# Patient Record
Sex: MALE | Race: OTHER | HISPANIC OR LATINO | URBAN - METROPOLITAN AREA
[De-identification: names, ages, dates, MRNs, and addresses within clinical notes are randomized per-mention and may not be internally consistent; named-entity substitution may affect disease eponyms.]

---

## 2021-04-07 ENCOUNTER — INPATIENT (INPATIENT)
Facility: HOSPITAL | Age: 56
LOS: 8 days | Discharge: HOME | End: 2021-04-16
Attending: HOSPITALIST | Admitting: HOSPITALIST
Payer: MEDICAID

## 2021-04-07 VITALS
DIASTOLIC BLOOD PRESSURE: 65 MMHG | OXYGEN SATURATION: 95 % | SYSTOLIC BLOOD PRESSURE: 103 MMHG | TEMPERATURE: 100 F | HEART RATE: 86 BPM | RESPIRATION RATE: 17 BRPM

## 2021-04-07 PROCEDURE — 99285 EMERGENCY DEPT VISIT HI MDM: CPT

## 2021-04-07 RX ORDER — SODIUM CHLORIDE 9 MG/ML
1000 INJECTION INTRAMUSCULAR; INTRAVENOUS; SUBCUTANEOUS ONCE
Refills: 0 | Status: COMPLETED | OUTPATIENT
Start: 2021-04-07 | End: 2021-04-07

## 2021-04-07 NOTE — ED ADULT TRIAGE NOTE - CHIEF COMPLAINT QUOTE
Patient c/o RUQ painx4 days. Patient reports pain worsens after eating. Patient denies any nausea/vomiting or diarrhea. Patient was prescribed amoxicillin and flagyl by urgent care. Denies any past medical history.

## 2021-04-08 ENCOUNTER — TRANSCRIPTION ENCOUNTER (OUTPATIENT)
Age: 56
End: 2021-04-08

## 2021-04-08 DIAGNOSIS — U07.1 COVID-19: ICD-10-CM

## 2021-04-08 DIAGNOSIS — E78.5 HYPERLIPIDEMIA, UNSPECIFIED: ICD-10-CM

## 2021-04-08 DIAGNOSIS — J18.9 PNEUMONIA, UNSPECIFIED ORGANISM: ICD-10-CM

## 2021-04-08 DIAGNOSIS — J96.01 ACUTE RESPIRATORY FAILURE WITH HYPOXIA: ICD-10-CM

## 2021-04-08 DIAGNOSIS — K75.81 NONALCOHOLIC STEATOHEPATITIS (NASH): ICD-10-CM

## 2021-04-08 DIAGNOSIS — E11.9 TYPE 2 DIABETES MELLITUS WITHOUT COMPLICATIONS: ICD-10-CM

## 2021-04-08 LAB
A1C WITH ESTIMATED AVERAGE GLUCOSE RESULT: 7.2 % — HIGH (ref 4–5.6)
ALBUMIN SERPL ELPH-MCNC: 3.8 G/DL — SIGNIFICANT CHANGE UP (ref 3.3–5)
ALBUMIN SERPL ELPH-MCNC: 4.1 G/DL — SIGNIFICANT CHANGE UP (ref 3.3–5)
ALP SERPL-CCNC: 61 U/L — SIGNIFICANT CHANGE UP (ref 40–120)
ALP SERPL-CCNC: 74 U/L — SIGNIFICANT CHANGE UP (ref 40–120)
ALT FLD-CCNC: 27 U/L — SIGNIFICANT CHANGE UP (ref 4–41)
ALT FLD-CCNC: 35 U/L — SIGNIFICANT CHANGE UP (ref 4–41)
ANION GAP SERPL CALC-SCNC: 14 MMOL/L — SIGNIFICANT CHANGE UP (ref 7–14)
APTT BLD: 33.1 SEC — SIGNIFICANT CHANGE UP (ref 27–36.3)
AST SERPL-CCNC: 26 U/L — SIGNIFICANT CHANGE UP (ref 4–40)
AST SERPL-CCNC: 35 U/L — SIGNIFICANT CHANGE UP (ref 4–40)
BASE EXCESS BLDV CALC-SCNC: 1.6 MMOL/L — SIGNIFICANT CHANGE UP (ref -3–2)
BASOPHILS # BLD AUTO: 0.01 K/UL — SIGNIFICANT CHANGE UP (ref 0–0.2)
BASOPHILS NFR BLD AUTO: 0.2 % — SIGNIFICANT CHANGE UP (ref 0–2)
BILIRUB DIRECT SERPL-MCNC: <0.2 MG/DL — SIGNIFICANT CHANGE UP (ref 0–0.2)
BILIRUB INDIRECT FLD-MCNC: >0.2 MG/DL — SIGNIFICANT CHANGE UP (ref 0–1)
BILIRUB SERPL-MCNC: 0.4 MG/DL — SIGNIFICANT CHANGE UP (ref 0.2–1.2)
BILIRUB SERPL-MCNC: 0.5 MG/DL — SIGNIFICANT CHANGE UP (ref 0.2–1.2)
BLOOD GAS VENOUS - CREATININE: 0.9 MG/DL — SIGNIFICANT CHANGE UP (ref 0.5–1.3)
BLOOD GAS VENOUS COMPREHENSIVE RESULT: SIGNIFICANT CHANGE UP
BUN SERPL-MCNC: 14 MG/DL — SIGNIFICANT CHANGE UP (ref 7–23)
CALCIUM SERPL-MCNC: 8.9 MG/DL — SIGNIFICANT CHANGE UP (ref 8.4–10.5)
CHLORIDE BLDV-SCNC: 99 MMOL/L — SIGNIFICANT CHANGE UP (ref 96–108)
CHLORIDE SERPL-SCNC: 96 MMOL/L — LOW (ref 98–107)
CK SERPL-CCNC: 122 U/L — SIGNIFICANT CHANGE UP (ref 30–200)
CO2 SERPL-SCNC: 22 MMOL/L — SIGNIFICANT CHANGE UP (ref 22–31)
CREAT SERPL-MCNC: 0.78 MG/DL — SIGNIFICANT CHANGE UP (ref 0.5–1.3)
CREAT SERPL-MCNC: 0.85 MG/DL — SIGNIFICANT CHANGE UP (ref 0.5–1.3)
CRP SERPL-MCNC: 84.2 MG/L — HIGH
D DIMER BLD IA.RAPID-MCNC: <150 NG/ML DDU — SIGNIFICANT CHANGE UP
EOSINOPHIL # BLD AUTO: 0 K/UL — SIGNIFICANT CHANGE UP (ref 0–0.5)
EOSINOPHIL NFR BLD AUTO: 0 % — SIGNIFICANT CHANGE UP (ref 0–6)
ESTIMATED AVERAGE GLUCOSE: 160 MG/DL — HIGH (ref 68–114)
FERRITIN SERPL-MCNC: 346 NG/ML — SIGNIFICANT CHANGE UP (ref 30–400)
GAS PNL BLDV: 135 MMOL/L — LOW (ref 136–146)
GLUCOSE BLDC GLUCOMTR-MCNC: 187 MG/DL — HIGH (ref 70–99)
GLUCOSE BLDC GLUCOMTR-MCNC: 237 MG/DL — HIGH (ref 70–99)
GLUCOSE BLDC GLUCOMTR-MCNC: 240 MG/DL — HIGH (ref 70–99)
GLUCOSE BLDC GLUCOMTR-MCNC: 339 MG/DL — HIGH (ref 70–99)
GLUCOSE BLDV-MCNC: 157 MG/DL — HIGH (ref 70–99)
GLUCOSE SERPL-MCNC: 154 MG/DL — HIGH (ref 70–99)
HCO3 BLDV-SCNC: 23 MMOL/L — SIGNIFICANT CHANGE UP (ref 20–27)
HCT VFR BLD CALC: 43 % — SIGNIFICANT CHANGE UP (ref 39–50)
HCT VFR BLDA CALC: 45 % — SIGNIFICANT CHANGE UP (ref 39–51)
HGB BLD CALC-MCNC: 14.7 G/DL — SIGNIFICANT CHANGE UP (ref 13–17)
HGB BLD-MCNC: 13.8 G/DL — SIGNIFICANT CHANGE UP (ref 13–17)
IANC: 3.73 K/UL — SIGNIFICANT CHANGE UP (ref 1.5–8.5)
IMM GRANULOCYTES NFR BLD AUTO: 0.4 % — SIGNIFICANT CHANGE UP (ref 0–1.5)
INR BLD: 1.15 RATIO — SIGNIFICANT CHANGE UP (ref 0.88–1.16)
LACTATE BLDV-MCNC: 1.4 MMOL/L — SIGNIFICANT CHANGE UP (ref 0.5–2)
LACTATE SERPL-SCNC: 1.2 MMOL/L — SIGNIFICANT CHANGE UP (ref 0.5–2)
LDH SERPL L TO P-CCNC: 298 U/L — HIGH (ref 135–225)
LIDOCAIN IGE QN: 89 U/L — HIGH (ref 7–60)
LYMPHOCYTES # BLD AUTO: 1.36 K/UL — SIGNIFICANT CHANGE UP (ref 1–3.3)
LYMPHOCYTES # BLD AUTO: 24.3 % — SIGNIFICANT CHANGE UP (ref 13–44)
MCHC RBC-ENTMCNC: 27.2 PG — SIGNIFICANT CHANGE UP (ref 27–34)
MCHC RBC-ENTMCNC: 32.1 GM/DL — SIGNIFICANT CHANGE UP (ref 32–36)
MCV RBC AUTO: 84.6 FL — SIGNIFICANT CHANGE UP (ref 80–100)
MONOCYTES # BLD AUTO: 0.47 K/UL — SIGNIFICANT CHANGE UP (ref 0–0.9)
MONOCYTES NFR BLD AUTO: 8.4 % — SIGNIFICANT CHANGE UP (ref 2–14)
NEUTROPHILS # BLD AUTO: 3.73 K/UL — SIGNIFICANT CHANGE UP (ref 1.8–7.4)
NEUTROPHILS NFR BLD AUTO: 66.7 % — SIGNIFICANT CHANGE UP (ref 43–77)
NRBC # BLD: 0 /100 WBCS — SIGNIFICANT CHANGE UP
NRBC # FLD: 0 K/UL — SIGNIFICANT CHANGE UP
PCO2 BLDV: 46 MMHG — SIGNIFICANT CHANGE UP (ref 42–55)
PH BLDV: 7.38 — SIGNIFICANT CHANGE UP (ref 7.32–7.43)
PLATELET # BLD AUTO: 161 K/UL — SIGNIFICANT CHANGE UP (ref 150–400)
PO2 BLDV: <24 MMHG — LOW (ref 35–40)
POTASSIUM BLDV-SCNC: 4.3 MMOL/L — SIGNIFICANT CHANGE UP (ref 3.4–4.5)
POTASSIUM SERPL-MCNC: 4.2 MMOL/L — SIGNIFICANT CHANGE UP (ref 3.5–5.3)
POTASSIUM SERPL-SCNC: 4.2 MMOL/L — SIGNIFICANT CHANGE UP (ref 3.5–5.3)
PROCALCITONIN SERPL-MCNC: 0.07 NG/ML — SIGNIFICANT CHANGE UP (ref 0.02–0.1)
PROT SERPL-MCNC: 6.8 G/DL — SIGNIFICANT CHANGE UP (ref 6–8.3)
PROT SERPL-MCNC: 8.3 G/DL — SIGNIFICANT CHANGE UP (ref 6–8.3)
PROTHROM AB SERPL-ACNC: 13.1 SEC — SIGNIFICANT CHANGE UP (ref 10.6–13.6)
RBC # BLD: 5.08 M/UL — SIGNIFICANT CHANGE UP (ref 4.2–5.8)
RBC # FLD: 12.4 % — SIGNIFICANT CHANGE UP (ref 10.3–14.5)
SAO2 % BLDV: 17.4 % — LOW (ref 60–85)
SARS-COV-2 RNA SPEC QL NAA+PROBE: DETECTED
SODIUM SERPL-SCNC: 132 MMOL/L — LOW (ref 135–145)
TROPONIN T, HIGH SENSITIVITY RESULT: <6 NG/L — SIGNIFICANT CHANGE UP
WBC # BLD: 5.59 K/UL — SIGNIFICANT CHANGE UP (ref 3.8–10.5)
WBC # FLD AUTO: 5.59 K/UL — SIGNIFICANT CHANGE UP (ref 3.8–10.5)

## 2021-04-08 PROCEDURE — 99223 1ST HOSP IP/OBS HIGH 75: CPT

## 2021-04-08 PROCEDURE — 12345: CPT | Mod: NC

## 2021-04-08 PROCEDURE — 71045 X-RAY EXAM CHEST 1 VIEW: CPT | Mod: 26

## 2021-04-08 PROCEDURE — 76705 ECHO EXAM OF ABDOMEN: CPT | Mod: 26

## 2021-04-08 RX ORDER — AZITHROMYCIN 500 MG/1
500 TABLET, FILM COATED ORAL ONCE
Refills: 0 | Status: COMPLETED | OUTPATIENT
Start: 2021-04-08 | End: 2021-04-08

## 2021-04-08 RX ORDER — ENOXAPARIN SODIUM 100 MG/ML
40 INJECTION SUBCUTANEOUS DAILY
Refills: 0 | Status: DISCONTINUED | OUTPATIENT
Start: 2021-04-08 | End: 2021-04-16

## 2021-04-08 RX ORDER — REMDESIVIR 5 MG/ML
100 INJECTION INTRAVENOUS EVERY 24 HOURS
Refills: 0 | Status: COMPLETED | OUTPATIENT
Start: 2021-04-09 | End: 2021-04-12

## 2021-04-08 RX ORDER — DEXTROSE 50 % IN WATER 50 %
12.5 SYRINGE (ML) INTRAVENOUS ONCE
Refills: 0 | Status: DISCONTINUED | OUTPATIENT
Start: 2021-04-08 | End: 2021-04-16

## 2021-04-08 RX ORDER — DEXTROSE 50 % IN WATER 50 %
15 SYRINGE (ML) INTRAVENOUS ONCE
Refills: 0 | Status: DISCONTINUED | OUTPATIENT
Start: 2021-04-08 | End: 2021-04-16

## 2021-04-08 RX ORDER — ACETAMINOPHEN 500 MG
975 TABLET ORAL ONCE
Refills: 0 | Status: COMPLETED | OUTPATIENT
Start: 2021-04-08 | End: 2021-04-08

## 2021-04-08 RX ORDER — GLUCAGON INJECTION, SOLUTION 0.5 MG/.1ML
1 INJECTION, SOLUTION SUBCUTANEOUS ONCE
Refills: 0 | Status: DISCONTINUED | OUTPATIENT
Start: 2021-04-08 | End: 2021-04-16

## 2021-04-08 RX ORDER — SODIUM CHLORIDE 9 MG/ML
1000 INJECTION, SOLUTION INTRAVENOUS
Refills: 0 | Status: DISCONTINUED | OUTPATIENT
Start: 2021-04-08 | End: 2021-04-16

## 2021-04-08 RX ORDER — DEXTROSE 50 % IN WATER 50 %
25 SYRINGE (ML) INTRAVENOUS ONCE
Refills: 0 | Status: DISCONTINUED | OUTPATIENT
Start: 2021-04-08 | End: 2021-04-16

## 2021-04-08 RX ORDER — REMDESIVIR 5 MG/ML
200 INJECTION INTRAVENOUS EVERY 24 HOURS
Refills: 0 | Status: COMPLETED | OUTPATIENT
Start: 2021-04-08 | End: 2021-04-08

## 2021-04-08 RX ORDER — REMDESIVIR 5 MG/ML
INJECTION INTRAVENOUS
Refills: 0 | Status: COMPLETED | OUTPATIENT
Start: 2021-04-12 | End: 2021-04-12

## 2021-04-08 RX ORDER — INSULIN LISPRO 100/ML
VIAL (ML) SUBCUTANEOUS AT BEDTIME
Refills: 0 | Status: DISCONTINUED | OUTPATIENT
Start: 2021-04-08 | End: 2021-04-16

## 2021-04-08 RX ORDER — DEXAMETHASONE 0.5 MG/5ML
6 ELIXIR ORAL DAILY
Refills: 0 | Status: DISCONTINUED | OUTPATIENT
Start: 2021-04-08 | End: 2021-04-16

## 2021-04-08 RX ORDER — CEFTRIAXONE 500 MG/1
1000 INJECTION, POWDER, FOR SOLUTION INTRAMUSCULAR; INTRAVENOUS ONCE
Refills: 0 | Status: COMPLETED | OUTPATIENT
Start: 2021-04-08 | End: 2021-04-08

## 2021-04-08 RX ORDER — MORPHINE SULFATE 50 MG/1
2 CAPSULE, EXTENDED RELEASE ORAL ONCE
Refills: 0 | Status: DISCONTINUED | OUTPATIENT
Start: 2021-04-08 | End: 2021-04-08

## 2021-04-08 RX ORDER — ACETAMINOPHEN 500 MG
650 TABLET ORAL EVERY 4 HOURS
Refills: 0 | Status: DISCONTINUED | OUTPATIENT
Start: 2021-04-08 | End: 2021-04-16

## 2021-04-08 RX ORDER — ALBUTEROL 90 UG/1
2 AEROSOL, METERED ORAL EVERY 4 HOURS
Refills: 0 | Status: DISCONTINUED | OUTPATIENT
Start: 2021-04-08 | End: 2021-04-16

## 2021-04-08 RX ORDER — SODIUM CHLORIDE 9 MG/ML
1000 INJECTION INTRAMUSCULAR; INTRAVENOUS; SUBCUTANEOUS ONCE
Refills: 0 | Status: COMPLETED | OUTPATIENT
Start: 2021-04-08 | End: 2021-04-08

## 2021-04-08 RX ORDER — ACETAMINOPHEN 500 MG
650 TABLET ORAL EVERY 4 HOURS
Refills: 0 | Status: DISCONTINUED | OUTPATIENT
Start: 2021-04-08 | End: 2021-04-08

## 2021-04-08 RX ORDER — INSULIN LISPRO 100/ML
VIAL (ML) SUBCUTANEOUS
Refills: 0 | Status: DISCONTINUED | OUTPATIENT
Start: 2021-04-08 | End: 2021-04-16

## 2021-04-08 RX ADMIN — Medication 2: at 10:24

## 2021-04-08 RX ADMIN — ENOXAPARIN SODIUM 40 MILLIGRAM(S): 100 INJECTION SUBCUTANEOUS at 13:07

## 2021-04-08 RX ADMIN — Medication 8: at 18:31

## 2021-04-08 RX ADMIN — ALBUTEROL 2 PUFF(S): 90 AEROSOL, METERED ORAL at 21:33

## 2021-04-08 RX ADMIN — Medication 650 MILLIGRAM(S): at 22:10

## 2021-04-08 RX ADMIN — REMDESIVIR 200 MILLIGRAM(S): 5 INJECTION INTRAVENOUS at 13:06

## 2021-04-08 RX ADMIN — Medication 4: at 13:12

## 2021-04-08 RX ADMIN — AZITHROMYCIN 255 MILLIGRAM(S): 500 TABLET, FILM COATED ORAL at 04:10

## 2021-04-08 RX ADMIN — MORPHINE SULFATE 2 MILLIGRAM(S): 50 CAPSULE, EXTENDED RELEASE ORAL at 00:19

## 2021-04-08 RX ADMIN — Medication 975 MILLIGRAM(S): at 00:19

## 2021-04-08 RX ADMIN — Medication 6 MILLIGRAM(S): at 06:25

## 2021-04-08 RX ADMIN — SODIUM CHLORIDE 1000 MILLILITER(S): 9 INJECTION INTRAMUSCULAR; INTRAVENOUS; SUBCUTANEOUS at 02:45

## 2021-04-08 RX ADMIN — CEFTRIAXONE 100 MILLIGRAM(S): 500 INJECTION, POWDER, FOR SOLUTION INTRAMUSCULAR; INTRAVENOUS at 02:33

## 2021-04-08 RX ADMIN — Medication 650 MILLIGRAM(S): at 22:00

## 2021-04-08 RX ADMIN — Medication 650 MILLIGRAM(S): at 07:00

## 2021-04-08 RX ADMIN — SODIUM CHLORIDE 1000 MILLILITER(S): 9 INJECTION INTRAMUSCULAR; INTRAVENOUS; SUBCUTANEOUS at 00:09

## 2021-04-08 NOTE — ED PROVIDER NOTE - PROGRESS NOTE DETAILS
pt pending ua and us to be read. Endorse to night team. xr concerning for left sided pna, will order IV abx for CAP. pending us read. and will need an ambulatory sat.  Darron POSADA Delgado amb sat 89%. pt tachypneic with exertion. Hypotension 96/58. Improved with IVF.  Will admit for further treatment.  abd pain has improved. abd soft non tender. Labs unremarkable.

## 2021-04-08 NOTE — H&P ADULT - HISTORY OF PRESENT ILLNESS
Intake-3: Lyle Mckay  55 M no PMH p/w RUQ pain X1 day. Pain is constant, dull, nonradiating, worsened by food and associated with nausea, but no V/D.   Reports cough as well.  Seen at Urgent Care, COVID neg, discharged w/ Abx    NKDA    Meds:    Social: Patient is a 54 y/o M PMH HLD, DM2 p/w RUQ pain X2 day. Reports pain is intermittent,  dull, non-radiating, worsened with food, associated w/ nausea, no vomiting. Reports pain resolved in the ED, however, has developed a non-productive cough while in the ED. No other symptoms, no sick contacts, no recent travel.  Seen at Urgent Care 4/7 ~16:00, COVID neg, discharged w/ Abx

## 2021-04-08 NOTE — ED PROVIDER NOTE - NS_ ATTENDINGSCRIBEDETAILS _ED_A_ED_FT
The scribe's documentation has been prepared under my direction and personally reviewed by me in it's entirely.  I confirm that the note above accurately reflects all work, treatment, procedures, and medical decision making performed by me.  (Dr. Grewal)

## 2021-04-08 NOTE — ED ADULT NURSE NOTE - OBJECTIVE STATEMENT
Pt received to intake. Pt C/O new onset of RUQ pain. Pt endorsing symptoms for 4 days. Pt states pain is made worse with eating. Abd soft and tender at this time. Pt denies all other symptoms. Febrile at this time. Placed on 2L NC for O2 sat. Denies sick contacts. Resting comfortably. Labs drawn as ordered. MD wise in progress.

## 2021-04-08 NOTE — DISCHARGE NOTE PROVIDER - NSDCFUADDAPPT_GEN_ALL_CORE_FT
Follow up with your primary care doctor within one week of discharge. If you do not have one, you can call the medicine clinic at [phone] to make an appointment.

## 2021-04-08 NOTE — H&P ADULT - ASSESSMENT
55 M PMH HLD, DM2 p/w RUQ pain X2 day. Reports pain is intermittent,  dull, non-radiating, worsened with food, associated w/ nausea, no vomiting. Reports pain resolved in the ED, however, has developed a non-productive cough while in the ED. No other symptoms, no sick contacts, no recent travel.

## 2021-04-08 NOTE — ED ADULT NURSE REASSESSMENT NOTE - NS ED NURSE REASSESS COMMENT FT1
Pt resting comfortably at this time. Pt offering no complaints and remains in no acute distress. Respirations even and unlabored. Vitals per flowsheet. [place]bell remains in reach.

## 2021-04-08 NOTE — H&P ADULT - NSICDXPASTMEDICALHX_GEN_ALL_CORE_FT
PAST MEDICAL HISTORY:  Diabetes mellitus type 2, noninsulin dependent     Hyperlipidemia     No pertinent past medical history

## 2021-04-08 NOTE — H&P ADULT - NSHPPHYSICALEXAM_GEN_ALL_CORE
T(C): 37.2 (04-08-21 @ 02:34), Max: 38.4 (04-08-21 @ 00:10)  HR: 60 (04-08-21 @ 02:34) (60 - 86)  BP: 103/52 (04-08-21 @ 03:30) (96/58 - 160/86)  RR: 19 (04-08-21 @ 02:34) (17 - 19)  SpO2: 91% (04-08-21 @ 03:30) (91% - 97%)    Constitutional: NAD, well-developed, well-nourished  Ears, Nose, Mouth, and Throat: normal external ears and nose, normal hearing, moist oral mucosa  Eyes: normal conjunctiva, EOMI, PERRL  Neck: supple, no JVD  Respiratory: Clear to auscultation bilaterally. No wheezes, rales or rhonchi. Normal respiratory effort  Cardiovascular: RRR, no M/R/G, no edema, 2+ Peripheral Pulses  Gastrointestinal: soft, nontender, nondistended, +BS, no hernia  Skin: warm, dry, no rash  Neurologic: sensation grossly intact, CN grossly intact, non-focal exam  Musculoskeletal: no clubbing, no cyanosis, no joint swelling  Psychiatric: AOX3, appropriate mood, affect

## 2021-04-08 NOTE — DISCHARGE NOTE PROVIDER - PROVIDER TOKENS
FREE:[LAST:[Your PCP],PHONE:[(   )    -],FAX:[(   )    -]] FREE:[LAST:[Your Primary Care Doctor],PHONE:[(   )    -],FAX:[(   )    -]]

## 2021-04-08 NOTE — ED PROVIDER NOTE - PHYSICAL EXAMINATION
Dr Grewal  Sitting up, mmm, nonicteric.  HR 81 regular  no retractions. talking in full sentences.   soft tender at the RUQ no rebound.   Ao3

## 2021-04-08 NOTE — DISCHARGE NOTE PROVIDER - NSDCMRMEDTOKEN_GEN_ALL_CORE_FT
repaglinide:    aspirin 81 mg oral tablet, chewable: 1 tab(s) orally once a day  atorvastatin 10 mg oral tablet: 1 tab(s) orally once a day (at bedtime)  repaglinide:   rivaroxaban 10 mg oral tablet: 1 tab(s) orally once a day    aspirin 81 mg oral tablet, chewable: 1 tab(s) orally once a day  atorvastatin 10 mg oral tablet: 1 tab(s) orally once a day (at bedtime)  dexamethasone 6 mg oral tablet: 1 tab(s) orally once a day   repaglinide:   rivaroxaban 10 mg oral tablet: 1 tab(s) orally once a day    aspirin 81 mg oral tablet, chewable: 1 tab(s) orally once a day  atorvastatin 10 mg oral tablet: 1 tab(s) orally once a day (at bedtime)  dexamethasone 6 mg oral tablet: 1 tab(s) orally once a day   repaglinide 1 mg oral tablet: 4 tab(s) orally 3 times a day (before meals) on 4/17 and 4/18 ---THEN--- 1 tablet orally 3 times a day (before meals) starting on 4/19  rivaroxaban 10 mg oral tablet: 1 tab(s) orally once a day

## 2021-04-08 NOTE — DISCHARGE NOTE PROVIDER - NSDCFUADDINST_GEN_ALL_CORE_FT
If you have any new or worsening symptoms, contact your physician or return to the hospital. If you have any new or worsening symptoms, contact your physician or return to the hospital.    Resume Prandin at INCREASED dose of 4 mg three times daily before meals (Hold if not eating meal) for 4/17, 4/18. Decrease dose back to home dosing Prandin 1 mg three times daily before meals starting 4/19.

## 2021-04-08 NOTE — H&P ADULT - NSHPLABSRESULTS_GEN_ALL_CORE
04-08    132<L>  |  96<L>  |  14  ----------------------------<  154<H>  4.2   |  22  |  0.85    Ca    8.9      08 Apr 2021 00:33    TPro  8.3  /  Alb  4.1  /  TBili  0.5  /  DBili  x   /  AST  35  /  ALT  35  /  AlkPhos  74  04-08                          13.8   5.59  )-----------( 161      ( 08 Apr 2021 00:33 )             43.0       LIVER FUNCTIONS - ( 08 Apr 2021 00:33 )  Alb: 4.1 g/dL / Pro: 8.3 g/dL / ALK PHOS: 74 U/L / ALT: 35 U/L / AST: 35 U/L / GGT: x           EKG personally reviewed, NSR, no acute findings

## 2021-04-08 NOTE — H&P ADULT - NSHPREVIEWOFSYSTEMS_GEN_ALL_CORE
Constitutional Symptoms: No weakness, fevers, chills, weight loss  Eyes: No visual changes, headache, eye pain, double vision  Ears, Nose, Mouth, Throat: No runny nose, sinus pain, ear pain, tinnitus, sore throat, dysphagia, odynophagia  Cardiovascular: No chest pain, palpitations, edema  Respiratory: +cough, no wheezing, hemoptysis, shortness of breath  Gastrointestinal: +abdominal pain, no dysphagia, anorexia, nausea/vomiting, diarrhea/constipation, hematemesis, BRBPR, melena  Genitourinary: No dysuria, frequency, hematuria  Musculoskeletal: No joint pain, joint swelling, decreased ROM  Skin: No pruritus, rashes, lesions, wounds  Neurologic:  No seizures, headache, paraesthesia, numbness, limb weakness    Positives and pertinent negatives noted and all other systems negative.

## 2021-04-08 NOTE — CHART NOTE - NSCHARTNOTEFT_GEN_A_CORE
pt seen and examined by me  H&P reviewed from earlier today  feeling better  VSS  reports resolution of abd pain  on 2L NC  no resp distress  cont remdesivir  trend inflam markers

## 2021-04-08 NOTE — DISCHARGE NOTE PROVIDER - NSDCCPCAREPLAN_GEN_ALL_CORE_FT
PRINCIPAL DISCHARGE DIAGNOSIS  Diagnosis: COVID-19  Assessment and Plan of Treatment: You have been diagnosed with the COVID-19 virus during your hospital stay. You must self quarantine to complete a 14 day time period.  Monitor for fevers, shortness of breath and cough primarily.  Monitor your temperature daily to not any changes and increases.    It has been determined that you no longer need hospitalization and can recover while remaining in self-quarantine at home. You should follow the prevention steps below until a healthcare provider or local or state health department says you can return to your normal activities.  1. You should restrict activities outside your home, except for getting medical care.  2. Do not go to work, school, or public areas.  3. Avoid using public transportation, ride-sharing, or taxis.  4. Separate yourself from other people and animals in your home.  5. Call ahead before visiting your doctor.  6. Wear a facemask.  7. Cover your coughs and sneezes.  8. Clean your hands often.  9. Avoid sharing personal household items.  10. Clean all “high-touch” surfaces everyday.  11. Monitor your symptoms.  If you have a medical emergency and need to call 911, notify the dispatch personnel that you have COVID-19 If possible, put on a facemask before emergency medical services arrive.  12. Stopping home isolation.  Patients with confirmed COVID-19 should remain under home isolation precautions for 14 days since the positive COVID-19 test and until the risk of secondary transmission to others is thought to be low. The decision to discontinue home isolation precautions should be made on a case-by-case basis, in consultation with healthcare providers and state and local health departments. Your [facility] Department of Health can be reached at [phone] for further information about COVID-19.       PRINCIPAL DISCHARGE DIAGNOSIS  Diagnosis: COVID-19  Assessment and Plan of Treatment: You have been diagnosed with the COVID-19 virus during your hospital stay. You must self quarantine to complete a 10 day time period.  Monitor for fevers, shortness of breath and cough primarily.  Monitor your temperature daily to not any changes and increases.  While in the hospital you completed treatment with antiviral agent (Remdesivir) and steroids (Dexamethasone).  You did not meet criteria to discharge home with supplemental oxygen.  Your contact information was provided to the [facility] for COVID follow up after hospital discharge.     It has been determined that you no longer need hospitalization and can recover while remaining in self-quarantine at home. You should follow the prevention steps below until a healthcare provider or local or state health department says you can return to your normal activities.  1. You should restrict activities outside your home, except for getting medical care.  2. Do not go to work, school, or public areas.  3. Avoid using public transportation, ride-sharing, or taxis.  4. Separate yourself from other people and animals in your home.  5. Call ahead before visiting your doctor.  6. Wear a facemask.  7. Cover your coughs and sneezes.  8. Clean your hands often.  9. Avoid sharing personal household items.  10. Clean all “high-touch” surfaces everyday.  11. Monitor your symptoms.  If you have a medical emergency and need to call 911, notify the dispatch personnel that you have COVID-19 If possible, put on a facemask before emergency medical services arrive.  12. Stopping home isolation.  Patients with confirmed COVID-19 should remain under home isolation precautions for 10 days since the positive COVID-19 test and until the risk of secondary transmission to others is thought to be low. The decision to discontinue home isolation precautions should be made on a case-by-case basis, in consultation with healthcare providers and state and local health departments. Your [facility] Department of Health can be reached at [phone]      SECONDARY DISCHARGE DIAGNOSES  Diagnosis: COLE (nonalcoholic steatohepatitis)  Assessment and Plan of Treatment: You were found to have a fatty liver (nonalcoholic steatohepatitis) while in the hospital by ultrasound of your abdomen.  Your liver function bloodwork was normal. Your cholesterol panel was reviewed (ldl 91, chol 145) and you were started on cholesterol medication.  Please continue your medications as prescribed and follow up with your primary care doctor within one week of discharge for further management.       Diagnosis: Diabetes mellitus type 2, noninsulin dependent  Assessment and Plan of Treatment: You have a history of diabetes, and on admission to the hospital your A1C was 7.1.  You were given steroids in the hospital which caused your blood sugars to be high.  Please continue your medication regimen  as prescirbed.  Please follow a consistent carbohydrate diet (meaning eating the same amount of carbohydrates at the same time each day). Monitor blood glucose levels throughout the day before meals and at bedtime. Record blood sugars and bring to outpatient providers appointment in order to be reviewed by your doctor for management modifications. If your sugars are more than 400 or less than 70 you should contact your PCP immediately. Monitor for signs/symptoms of low blood glucose, such as, dizziness, altered mental status, or cool/clammy skin. In addition, monitor for signs/symptoms of high blood glucose, such as, feeling hot, dry, fatigued, or with increased thirst/urination. Make regular podiatry appointments in order to have feet checked for wounds and uncontrolled toe nail growth to prevent infections, as well as, appointments with an ophthalmologist to monitor your vision.     PRINCIPAL DISCHARGE DIAGNOSIS  Diagnosis: COVID-19  Assessment and Plan of Treatment: You have been diagnosed with the COVID-19 virus during your hospital stay. You must self quarantine to complete a 10 day time period.  Monitor for fevers, shortness of breath and cough primarily.  Monitor your temperature daily to not any changes and increases.  While in the hospital you completed treatment with antiviral agent (Remdesivir) and steroids (Dexamethasone).  You did not meet criteria to discharge home with supplemental oxygen.  Your contact information was provided to the [facility] for COVID follow up after hospital discharge.     It has been determined that you no longer need hospitalization and can recover while remaining in self-quarantine at home. You should follow the prevention steps below until a healthcare provider or local or state health department says you can return to your normal activities.  1. You should restrict activities outside your home, except for getting medical care.  2. Do not go to work, school, or public areas.  3. Avoid using public transportation, ride-sharing, or taxis.  4. Separate yourself from other people and animals in your home.  5. Call ahead before visiting your doctor.  6. Wear a facemask.  7. Cover your coughs and sneezes.  8. Clean your hands often.  9. Avoid sharing personal household items.  10. Clean all “high-touch” surfaces everyday.  11. Monitor your symptoms.  If you have a medical emergency and need to call 911, notify the dispatch personnel that you have COVID-19 If possible, put on a facemask before emergency medical services arrive.  12. Stopping home isolation.  Patients with confirmed COVID-19 should remain under home isolation precautions for 10 days since the positive COVID-19 test and until the risk of secondary transmission to others is thought to be low. The decision to discontinue home isolation precautions should be made on a case-by-case basis, in consultation with healthcare providers and state and local health departments. Your [facility] Department of Health can be reached at [phone]      SECONDARY DISCHARGE DIAGNOSES  Diagnosis: COLE (nonalcoholic steatohepatitis)  Assessment and Plan of Treatment: You were found to have a fatty liver (nonalcoholic steatohepatitis) while in the hospital by ultrasound of your abdomen.  Your liver function bloodwork was normal. Your cholesterol panel was reviewed (ldl 91, chol 145) and you were started on cholesterol medication.  Please continue your medications as prescribed and follow up with your primary care doctor within one week of discharge for further management.       Diagnosis: Diabetes mellitus type 2, noninsulin dependent  Assessment and Plan of Treatment: You have a history of diabetes, and on admission to the hospital your A1C was 7.1.  You were given steroids in the hospital which caused your blood sugars to be high.  Please continue your medication regimen  as prescirbed.  On discharge Resume Prandin at INCREASED dose of 4 mg PO TID before meals (Hold if not eating meal) for 4/17, 4/18. Decrease dose back to home dosing Prandin 1 mg TID before meals starting 4/19.  Please follow a consistent carbohydrate diet (meaning eating the same amount of carbohydrates at the same time each day). Monitor blood glucose levels throughout the day before meals and at bedtime. Record blood sugars and bring to outpatient providers appointment in order to be reviewed by your doctor for management modifications. If your sugars are more than 400 or less than 70 you should contact your PCP immediately. Monitor for signs/symptoms of low blood glucose, such as, dizziness, altered mental status, or cool/clammy skin. In addition, monitor for signs/symptoms of high blood glucose, such as, feeling hot, dry, fatigued, or with increased thirst/urination. Make regular podiatry appointments in order to have feet checked for wounds and uncontrolled toe nail growth to prevent infections, as well as, appointments with an ophthalmologist to monitor your vision.

## 2021-04-08 NOTE — DISCHARGE NOTE PROVIDER - NSFOLLOWUPCLINICS_GEN_ALL_ED_FT
[facility] [place]  Internal Medicine  [address] [place] [address]  Phone: [phone]  Fax: [phone]

## 2021-04-08 NOTE — ED PROVIDER NOTE - OBJECTIVE STATEMENT
56 y/o male with no reported pertinent PMHx presents to the ED with c/o x 1 day of RUQ pain. Pt states pain is constant, dull, and does not radiate and is not pleuritic. Pt notes pain is worsen by food and associated with nausea, but no V/D. Pt also denies any chest pain or SOB. As per Pt cough just started. Pt reports was seen at Urgent Care and states COVID test was negative and d/c with PO Abx. Of note no recent travel and no sick contacts. Dr Grewal  54 y/o male with no reported pertinent PMHx presents to the ED with c/o x 1 day of RUQ pain. Pt states pain is constant, dull, and does not radiate and is not pleuritic. Pt notes pain is worsen by food and associated with nausea, but no V/D. Pt also denies any chest pain or SOB. As per Pt cough just started. Pt reports was seen at Urgent Care and states COVID test was negative and d/c with PO Abx. Of note no recent travel and no sick contacts.

## 2021-04-08 NOTE — DISCHARGE NOTE PROVIDER - CARE PROVIDER_API CALL
Your PCP,   Phone: (   )    -  Fax: (   )    -  Follow Up Time:    Your Primary Care Doctor,   Phone: (   )    -  Fax: (   )    -  Follow Up Time:

## 2021-04-08 NOTE — ED PROVIDER NOTE - CLINICAL SUMMARY MEDICAL DECISION MAKING FREE TEXT BOX
Plan for labs, RUQ sonogram, CXR, IV fluids, pain control, and reassess. Plan for labs, RUQ sonogram, CXR, covid,  IV fluids, pain control, and reassess.

## 2021-04-08 NOTE — DISCHARGE NOTE PROVIDER - HOSPITAL COURSE
Patient is a 56 y/o M PMH HLD, DM2 p/w RUQ pain X2 day. Reports pain is intermittent,  dull, non-radiating, worsened with food, associated w/ nausea, no vomiting. Reports pain resolved in the ED, however, has developed a non-productive cough while in the ED. No other symptoms, no sick contacts, no recent travel. Seen at Urgent Care 4/7 ~16:00, COVID neg, discharged w/ Abx.    Patient was admitted for COVID. Patient was started on remdesivir and dexamethasone. Patient was started on supplemental oxygen. Patient was weaned to _______.     On ___ this case was reviewed with  ____, the patient is medically stable and optimized for discharge. All medications were reviewed and prescriptions were sent to mutually agreed upon pharmacy.              56 y/o M PMH HLD, DM2 p/w RUQ pain X2 day. Reports pain is intermittent,  dull, non-radiating, worsened with food, associated w/ nausea, no vomiting. Reports pain resolved in the ED, however, has developed a non-productive cough while in the ED. No other symptoms, no sick contacts, no recent travel. Seen at Urgent Care 4/7 ~16:00, COVID neg, discharged w/ Abx.    Acute respiratory failure with hypoxia 2/2 COVID-19  - required supplemental oxygen, tapered down to RA and satting 92% with ambulation  - completed 5 doses of remdesivir 4/12, c/w dexamethasone 6mg qd x10 doses through 4/18  - BP a bit soft, systolic 90's, asymptomatic  - refer to [employer] program, email [email or web address] and xarelto 10 mg qd x30 days on DC.     COLE (nonalcoholic steatohepatitis)  - outpt f/u  - nl LFT  - fatty liver on sono  - Lipid panel reviewed (ldl 91, chol 145), started low dose statin.     Diabetes mellitus type 2, noninsulin dependent  - Uncontrolled and volatile, A1c 7.1  - insulin regimen per endo, currently on lantus to 28u hs and admelog 28u tidac, on discharge transition on home regimen of prandin 1 mg before meals  - hyperglycemia likely d/t steroids  - c/w lipitor 10mg hs and ASA 81 mg for cardioprotection    Patient seen and evaluated. Reviewed discharge medications with patient and attending. All new medications requiring new prescriptions were sent to the pharmacy of patient's choice. Reviewed need for prescription for previous home medications and new prescriptions sent if requested. Medically cleared/stable for discharge as per Dr. Graves on 4/16/2021 with appropriate follow up. Patient understands and agrees with plan of care.               54 y/o M PMH HLD, DM2 p/w RUQ pain X2 day. Reports pain is intermittent,  dull, non-radiating, worsened with food, associated w/ nausea, no vomiting. Reports pain resolved in the ED, however, has developed a non-productive cough while in the ED. No other symptoms, no sick contacts, no recent travel. Seen at Urgent Care 4/7 ~16:00, COVID neg, discharged w/ Abx.    Acute respiratory failure with hypoxia 2/2 COVID-19  - required supplemental oxygen, tapered down to RA and satting 92% with ambulation  - completed 5 doses of remdesivir 4/12, c/w dexamethasone 6mg qd x10 doses through 4/18  - BP a bit soft, systolic 90's, asymptomatic  - refer to [employer] program, email [email or web address] and xarelto 10 mg qd x30 days on DC.     COLE (nonalcoholic steatohepatitis)  - outpt f/u  - nl LFT  - fatty liver on sono  - Lipid panel reviewed (ldl 91, chol 145), started low dose statin.     Diabetes mellitus type 2, noninsulin dependent  - Uncontrolled and volatile, A1c 7.1  - insulin regimen per endo, currently on lantus to 28u hs and admelog 28u tidac  - hyperglycemia likely d/t steroids  - c/w lipitor 10mg hs and ASA 81 mg for cardioprotection  - per endo: on dc Resume Prandin at INCREASED dose of 4 mg PO TID before meals (Hold if not eating meal) for 4/17. Decrease dose back to home dosing Prandin 1 mg TID before meals starting 4/18.    Patient seen and evaluated. Reviewed discharge medications with patient and attending. All new medications requiring new prescriptions were sent to the pharmacy of patient's choice. Reviewed need for prescription for previous home medications and new prescriptions sent if requested. Medically cleared/stable for discharge as per Dr. Graves on 4/16/2021 with appropriate follow up. Patient understands and agrees with plan of care.               54 y/o M PMH HLD, DM2 p/w RUQ pain X2 day. Reports pain is intermittent,  dull, non-radiating, worsened with food, associated w/ nausea, no vomiting. Reports pain resolved in the ED, however, has developed a non-productive cough while in the ED. No other symptoms, no sick contacts, no recent travel. Seen at Urgent Care 4/7 ~16:00, COVID neg, discharged w/ Abx.    Acute respiratory failure with hypoxia 2/2 COVID-19  - required supplemental oxygen, tapered down to RA and satting 92% with ambulation  - completed 5 doses of remdesivir 4/12, c/w dexamethasone 6mg qd x10 doses through 4/18  - BP a bit soft, systolic 90's, asymptomatic  - refer to [employer] program, email [email or web address] and xarelto 10 mg qd x30 days on DC.     COLE (nonalcoholic steatohepatitis)  - outpt f/u  - nl LFT  - fatty liver on sono  - Lipid panel reviewed (ldl 91, chol 145), started low dose statin.     Diabetes mellitus type 2, noninsulin dependent  - Uncontrolled and volatile, A1c 7.1  - insulin regimen per endo, currently on lantus to 28u hs and admelog 28u tidac  - hyperglycemia likely d/t steroids  - c/w lipitor 10mg hs and ASA 81 mg for cardioprotection  - per endo: on dc Resume Prandin at INCREASED dose of 4 mg PO TID before meals (Hold if not eating meal) for 4/17, 4/18. Decrease dose back to home dosing Prandin 1 mg TID before meals starting 4/19.    Patient seen and evaluated. Reviewed discharge medications with patient and attending. All new medications requiring new prescriptions were sent to the pharmacy of patient's choice. Reviewed need for prescription for previous home medications and new prescriptions sent if requested. Medically cleared/stable for discharge as per Dr. Graves on 4/16/2021 with appropriate follow up. Patient understands and agrees with plan of care.

## 2021-04-09 LAB
A1C WITH ESTIMATED AVERAGE GLUCOSE RESULT: 7.1 % — HIGH (ref 4–5.6)
ALBUMIN SERPL ELPH-MCNC: 3.6 G/DL — SIGNIFICANT CHANGE UP (ref 3.3–5)
ALBUMIN SERPL ELPH-MCNC: 3.7 G/DL — SIGNIFICANT CHANGE UP (ref 3.3–5)
ALP SERPL-CCNC: 65 U/L — SIGNIFICANT CHANGE UP (ref 40–120)
ALP SERPL-CCNC: 70 U/L — SIGNIFICANT CHANGE UP (ref 40–120)
ALT FLD-CCNC: 24 U/L — SIGNIFICANT CHANGE UP (ref 4–41)
ALT FLD-CCNC: 25 U/L — SIGNIFICANT CHANGE UP (ref 4–41)
ANION GAP SERPL CALC-SCNC: 12 MMOL/L — SIGNIFICANT CHANGE UP (ref 7–14)
AST SERPL-CCNC: 22 U/L — SIGNIFICANT CHANGE UP (ref 4–40)
AST SERPL-CCNC: 23 U/L — SIGNIFICANT CHANGE UP (ref 4–40)
BILIRUB DIRECT SERPL-MCNC: <0.2 MG/DL — SIGNIFICANT CHANGE UP (ref 0–0.2)
BILIRUB INDIRECT FLD-MCNC: >0.2 MG/DL — SIGNIFICANT CHANGE UP (ref 0–1)
BILIRUB SERPL-MCNC: 0.4 MG/DL — SIGNIFICANT CHANGE UP (ref 0.2–1.2)
BILIRUB SERPL-MCNC: 0.4 MG/DL — SIGNIFICANT CHANGE UP (ref 0.2–1.2)
BUN SERPL-MCNC: 15 MG/DL — SIGNIFICANT CHANGE UP (ref 7–23)
CALCIUM SERPL-MCNC: 9.1 MG/DL — SIGNIFICANT CHANGE UP (ref 8.4–10.5)
CHLORIDE SERPL-SCNC: 106 MMOL/L — SIGNIFICANT CHANGE UP (ref 98–107)
CO2 SERPL-SCNC: 23 MMOL/L — SIGNIFICANT CHANGE UP (ref 22–31)
COVID-19 SPIKE DOMAIN AB INTERP: NEGATIVE — SIGNIFICANT CHANGE UP
COVID-19 SPIKE DOMAIN ANTIBODY RESULT: 0.4 U/ML — SIGNIFICANT CHANGE UP
CREAT SERPL-MCNC: 0.68 MG/DL — SIGNIFICANT CHANGE UP (ref 0.5–1.3)
CREAT SERPL-MCNC: 0.68 MG/DL — SIGNIFICANT CHANGE UP (ref 0.5–1.3)
ESTIMATED AVERAGE GLUCOSE: 157 MG/DL — HIGH (ref 68–114)
GLUCOSE BLDC GLUCOMTR-MCNC: 212 MG/DL — HIGH (ref 70–99)
GLUCOSE BLDC GLUCOMTR-MCNC: 304 MG/DL — HIGH (ref 70–99)
GLUCOSE BLDC GLUCOMTR-MCNC: 387 MG/DL — HIGH (ref 70–99)
GLUCOSE BLDC GLUCOMTR-MCNC: 393 MG/DL — HIGH (ref 70–99)
GLUCOSE BLDC GLUCOMTR-MCNC: 477 MG/DL — CRITICAL HIGH (ref 70–99)
GLUCOSE SERPL-MCNC: 164 MG/DL — HIGH (ref 70–99)
HCT VFR BLD CALC: 42.6 % — SIGNIFICANT CHANGE UP (ref 39–50)
HGB BLD-MCNC: 14.2 G/DL — SIGNIFICANT CHANGE UP (ref 13–17)
INR BLD: 1.18 RATIO — HIGH (ref 0.88–1.16)
MAGNESIUM SERPL-MCNC: 2.5 MG/DL — SIGNIFICANT CHANGE UP (ref 1.6–2.6)
MCHC RBC-ENTMCNC: 28.1 PG — SIGNIFICANT CHANGE UP (ref 27–34)
MCHC RBC-ENTMCNC: 33.3 GM/DL — SIGNIFICANT CHANGE UP (ref 32–36)
MCV RBC AUTO: 84.2 FL — SIGNIFICANT CHANGE UP (ref 80–100)
NRBC # BLD: 0 /100 WBCS — SIGNIFICANT CHANGE UP
NRBC # FLD: 0 K/UL — SIGNIFICANT CHANGE UP
PHOSPHATE SERPL-MCNC: 2.9 MG/DL — SIGNIFICANT CHANGE UP (ref 2.5–4.5)
PLATELET # BLD AUTO: 174 K/UL — SIGNIFICANT CHANGE UP (ref 150–400)
POTASSIUM SERPL-MCNC: 4.6 MMOL/L — SIGNIFICANT CHANGE UP (ref 3.5–5.3)
POTASSIUM SERPL-SCNC: 4.6 MMOL/L — SIGNIFICANT CHANGE UP (ref 3.5–5.3)
PROT SERPL-MCNC: 7.4 G/DL — SIGNIFICANT CHANGE UP (ref 6–8.3)
PROT SERPL-MCNC: 7.9 G/DL — SIGNIFICANT CHANGE UP (ref 6–8.3)
PROTHROM AB SERPL-ACNC: 13.4 SEC — SIGNIFICANT CHANGE UP (ref 10.6–13.6)
RBC # BLD: 5.06 M/UL — SIGNIFICANT CHANGE UP (ref 4.2–5.8)
RBC # FLD: 12.7 % — SIGNIFICANT CHANGE UP (ref 10.3–14.5)
SARS-COV-2 IGG+IGM SERPL QL IA: 0.4 U/ML — SIGNIFICANT CHANGE UP
SARS-COV-2 IGG+IGM SERPL QL IA: NEGATIVE — SIGNIFICANT CHANGE UP
SODIUM SERPL-SCNC: 141 MMOL/L — SIGNIFICANT CHANGE UP (ref 135–145)
WBC # BLD: 9.1 K/UL — SIGNIFICANT CHANGE UP (ref 3.8–10.5)
WBC # FLD AUTO: 9.1 K/UL — SIGNIFICANT CHANGE UP (ref 3.8–10.5)

## 2021-04-09 PROCEDURE — 99233 SBSQ HOSP IP/OBS HIGH 50: CPT

## 2021-04-09 RX ORDER — INSULIN GLARGINE 100 [IU]/ML
5 INJECTION, SOLUTION SUBCUTANEOUS AT BEDTIME
Refills: 0 | Status: DISCONTINUED | OUTPATIENT
Start: 2021-04-09 | End: 2021-04-10

## 2021-04-09 RX ORDER — INSULIN LISPRO 100/ML
2 VIAL (ML) SUBCUTANEOUS ONCE
Refills: 0 | Status: COMPLETED | OUTPATIENT
Start: 2021-04-09 | End: 2021-04-09

## 2021-04-09 RX ADMIN — Medication 10: at 17:41

## 2021-04-09 RX ADMIN — Medication 4: at 21:32

## 2021-04-09 RX ADMIN — Medication 8: at 13:54

## 2021-04-09 RX ADMIN — Medication 4: at 09:51

## 2021-04-09 RX ADMIN — REMDESIVIR 500 MILLIGRAM(S): 5 INJECTION INTRAVENOUS at 13:20

## 2021-04-09 RX ADMIN — ENOXAPARIN SODIUM 40 MILLIGRAM(S): 100 INJECTION SUBCUTANEOUS at 13:19

## 2021-04-09 RX ADMIN — Medication 2 UNIT(S): at 23:30

## 2021-04-09 RX ADMIN — Medication 650 MILLIGRAM(S): at 07:51

## 2021-04-09 RX ADMIN — Medication 650 MILLIGRAM(S): at 06:12

## 2021-04-09 RX ADMIN — INSULIN GLARGINE 5 UNIT(S): 100 INJECTION, SOLUTION SUBCUTANEOUS at 21:32

## 2021-04-09 RX ADMIN — Medication 6 MILLIGRAM(S): at 06:11

## 2021-04-09 NOTE — PROGRESS NOTE ADULT - SUBJECTIVE AND OBJECTIVE BOX
[facility] Division of Hospital Medicine  Aileen Fair MD  Pager [phone]    Patient is a 55y old  Male who presents with a chief complaint of RUQ pain       SUBJECTIVE / OVERNIGHT EVENTS: reports feeling better; +BM; abd pain resolved; fever overnight      MEDICATIONS  (STANDING):  dexAMETHasone  Injectable 6 milliGRAM(s) IV Push daily  dextrose 40% Gel 15 Gram(s) Oral once  dextrose 5%. 1000 milliLiter(s) (50 mL/Hr) IV Continuous <Continuous>  dextrose 5%. 1000 milliLiter(s) (100 mL/Hr) IV Continuous <Continuous>  dextrose 50% Injectable 25 Gram(s) IV Push once  dextrose 50% Injectable 12.5 Gram(s) IV Push once  dextrose 50% Injectable 25 Gram(s) IV Push once  enoxaparin Injectable 40 milliGRAM(s) SubCutaneous daily  glucagon  Injectable 1 milliGRAM(s) IntraMuscular once  insulin lispro (ADMELOG) corrective regimen sliding scale   SubCutaneous three times a day before meals  insulin lispro (ADMELOG) corrective regimen sliding scale   SubCutaneous at bedtime  remdesivir  IVPB   IV Intermittent   remdesivir  IVPB 100 milliGRAM(s) IV Intermittent every 24 hours    MEDICATIONS  (PRN):  acetaminophen   Tablet .. 650 milliGRAM(s) Oral every 4 hours PRN Temp greater or equal to 38C (100.4F), Mild Pain (1 - 3), Moderate Pain (4 - 6)  ALBUTerol    90 MICROgram(s) HFA Inhaler 2 Puff(s) Inhalation every 4 hours PRN Shortness of Breath and/or Wheezing      CAPILLARY BLOOD GLUCOSE  POCT Blood Glucose.: 304 mg/dL (09 Apr 2021 13:42)  POCT Blood Glucose.: 212 mg/dL (09 Apr 2021 09:48)  POCT Blood Glucose.: 240 mg/dL (08 Apr 2021 21:21)  POCT Blood Glucose.: 339 mg/dL (08 Apr 2021 18:26)          PHYSICAL EXAM:  Vital Signs Last 24 Hrs  T(F): 97.7 (09 Apr 2021 12:33), Max: 101.1 (08 Apr 2021 22:09)  HR: 64 (09 Apr 2021 12:33) (64 - 72)  BP: 108/71 (09 Apr 2021 12:33) (105/62 - 118/58)  RR: 18 (09 Apr 2021 12:33) (18 - 20)  SpO2: 94% (09 Apr 2021 12:33) (89% - 94%)    CONSTITUTIONAL: NAD  RESPIRATORY: Normal respiratory effort;   CARDIOVASCULAR: No lower extremity edema;   ABDOMEN: no rebound/guarding; mild RUQ tenderness  MUSCULOSKELETAL:  no joint swelling or tenderness to palpation  PSYCH: affect appropriate  NEUROLOGY: no gross sensory deficits       LABS:                        14.2   9.10  )-----------( 174      ( 09 Apr 2021 06:54 )             42.6     04-09    141  |  106  |  15  ----------------------------<  164<H>  4.6   |  23  |  0.68    Ca    9.1      09 Apr 2021 06:54  Phos  2.9     04-09  Mg     2.5     04-09    TPro  7.4  /  Alb  3.7  /  TBili  0.4  /  DBili  <0.2  /  AST  22  /  ALT  25  /  AlkPhos  65  04-09    PT/INR - ( 09 Apr 2021 06:54 )   PT: 13.4 sec;   INR: 1.18 ratio         PTT - ( 08 Apr 2021 09:16 )  PTT:33.1 sec  CARDIAC MARKERS ( 08 Apr 2021 09:16 )  x     / x     / 122 U/L / x     / x              Culture - Blood (collected 08 Apr 2021 02:27)  Source: .Blood Blood-Venous  Preliminary Report (09 Apr 2021 03:01):    No growth to date.    Culture - Blood (collected 08 Apr 2021 02:27)  Source: .Blood Blood-Peripheral  Preliminary Report (09 Apr 2021 03:01):    No growth to date.

## 2021-04-09 NOTE — DIETITIAN INITIAL EVALUATION ADULT. - OTHER INFO
Pt is 55 YOM with PMH HLD, DM2 p/w RUQ pain X2 day. Reports pain is intermittent,  dull, non-radiating, worsened with food, associated w/ nausea, no vomiting. Reports pain resolved in the ED, however, has developed a non-productive cough while in the ED. No other symptoms, no sick contacts, no recent travel.      Received nutrition consult for weight loss Prior to admission. Pt states usual weight is 160#, however noted admit weight of 145#. When questioned further regarding weight status, pt reports admit weight was guessed/stated and not an actual measured weight. Some weight loss likely considering acute illness, however, unable to assess actual weight loss amount at present.   Pt reports PO intakes are good and completing >75% of meals in house.  Denies chewing, swallowing difficulties or any nausea, vomiting, diarrhea, constipation. Remains with PmHx of Diabetes and reports good control. Educated on CSTCHO diet and related principles.

## 2021-04-09 NOTE — DIETITIAN INITIAL EVALUATION ADULT. - PROBLEM SELECTOR PLAN 1
[id number] Patient Status:  Hospital Outpatient Surgery   Age/Gender 9 month old male MRN [id number]   [facility] SURGERY Attending Zoey Muro MD   Hosp Day # 0 PCP Carlos Gibson MD       Anesthesia Post-op
d/w patient, will start remdesivir, decadron, lovenox, will send COVID labs

## 2021-04-09 NOTE — CHART NOTE - NSCHARTNOTEFT_GEN_A_CORE
RN notified that patient is hyperglycemic -477 and patient is asymptomatic, no distress at this time. Advised to give sliding scale dose -admelog of 4 units and lantus -5 units and recheck the FS in 2 hours. The repeat FS- 387. Ordered another 2 units of admelog. Will recheck around 2 am and continue to monitor,

## 2021-04-09 NOTE — DIETITIAN INITIAL EVALUATION ADULT. - PERTINENT LABORATORY DATA
CAPILLARY BLOOD GLUCOSE  POCT Blood Glucose.: 212 mg/dL (09 Apr 2021 09:48)  POCT Blood Glucose.: 240 mg/dL (08 Apr 2021 21:21)  POCT Blood Glucose.: 339 mg/dL (08 Apr 2021 18:26)  POCT Blood Glucose.: 237 mg/dL (08 Apr 2021 13:01)    HIGH: HgA1C  7.1

## 2021-04-09 NOTE — DIETITIAN INITIAL EVALUATION ADULT. - PERTINENT MEDS FT
MEDICATIONS  (STANDING):  dexAMETHasone  Injectable 6 milliGRAM(s) IV Push daily  dextrose 40% Gel 15 Gram(s) Oral once  dextrose 5%. 1000 milliLiter(s) (50 mL/Hr) IV Continuous <Continuous>  dextrose 5%. 1000 milliLiter(s) (100 mL/Hr) IV Continuous <Continuous>  dextrose 50% Injectable 25 Gram(s) IV Push once  dextrose 50% Injectable 12.5 Gram(s) IV Push once  dextrose 50% Injectable 25 Gram(s) IV Push once  enoxaparin Injectable 40 milliGRAM(s) SubCutaneous daily  glucagon  Injectable 1 milliGRAM(s) IntraMuscular once  insulin lispro (ADMELOG) corrective regimen sliding scale   SubCutaneous three times a day before meals  insulin lispro (ADMELOG) corrective regimen sliding scale   SubCutaneous at bedtime  remdesivir  IVPB   IV Intermittent   remdesivir  IVPB 100 milliGRAM(s) IV Intermittent every 24 hours

## 2021-04-10 LAB
ALBUMIN SERPL ELPH-MCNC: 3.4 G/DL — SIGNIFICANT CHANGE UP (ref 3.3–5)
ALP SERPL-CCNC: 70 U/L — SIGNIFICANT CHANGE UP (ref 40–120)
ALT FLD-CCNC: 22 U/L — SIGNIFICANT CHANGE UP (ref 4–41)
ANION GAP SERPL CALC-SCNC: 10 MMOL/L — SIGNIFICANT CHANGE UP (ref 7–14)
AST SERPL-CCNC: 20 U/L — SIGNIFICANT CHANGE UP (ref 4–40)
BILIRUB SERPL-MCNC: 0.3 MG/DL — SIGNIFICANT CHANGE UP (ref 0.2–1.2)
BUN SERPL-MCNC: 21 MG/DL — SIGNIFICANT CHANGE UP (ref 7–23)
CALCIUM SERPL-MCNC: 9.3 MG/DL — SIGNIFICANT CHANGE UP (ref 8.4–10.5)
CHLORIDE SERPL-SCNC: 110 MMOL/L — HIGH (ref 98–107)
CO2 SERPL-SCNC: 23 MMOL/L — SIGNIFICANT CHANGE UP (ref 22–31)
CREAT SERPL-MCNC: 0.62 MG/DL — SIGNIFICANT CHANGE UP (ref 0.5–1.3)
GLUCOSE BLDC GLUCOMTR-MCNC: 247 MG/DL — HIGH (ref 70–99)
GLUCOSE BLDC GLUCOMTR-MCNC: 278 MG/DL — HIGH (ref 70–99)
GLUCOSE BLDC GLUCOMTR-MCNC: 295 MG/DL — HIGH (ref 70–99)
GLUCOSE BLDC GLUCOMTR-MCNC: 303 MG/DL — HIGH (ref 70–99)
GLUCOSE BLDC GLUCOMTR-MCNC: 325 MG/DL — HIGH (ref 70–99)
GLUCOSE BLDC GLUCOMTR-MCNC: 339 MG/DL — HIGH (ref 70–99)
GLUCOSE BLDC GLUCOMTR-MCNC: 399 MG/DL — HIGH (ref 70–99)
GLUCOSE SERPL-MCNC: 272 MG/DL — HIGH (ref 70–99)
HCT VFR BLD CALC: 42.3 % — SIGNIFICANT CHANGE UP (ref 39–50)
HGB BLD-MCNC: 14.3 G/DL — SIGNIFICANT CHANGE UP (ref 13–17)
MCHC RBC-ENTMCNC: 28.1 PG — SIGNIFICANT CHANGE UP (ref 27–34)
MCHC RBC-ENTMCNC: 33.8 GM/DL — SIGNIFICANT CHANGE UP (ref 32–36)
MCV RBC AUTO: 83.3 FL — SIGNIFICANT CHANGE UP (ref 80–100)
NRBC # BLD: 0 /100 WBCS — SIGNIFICANT CHANGE UP
NRBC # FLD: 0 K/UL — SIGNIFICANT CHANGE UP
PLATELET # BLD AUTO: 208 K/UL — SIGNIFICANT CHANGE UP (ref 150–400)
POTASSIUM SERPL-MCNC: 4.2 MMOL/L — SIGNIFICANT CHANGE UP (ref 3.5–5.3)
POTASSIUM SERPL-SCNC: 4.2 MMOL/L — SIGNIFICANT CHANGE UP (ref 3.5–5.3)
PROT SERPL-MCNC: 7.7 G/DL — SIGNIFICANT CHANGE UP (ref 6–8.3)
RBC # BLD: 5.08 M/UL — SIGNIFICANT CHANGE UP (ref 4.2–5.8)
RBC # FLD: 12.9 % — SIGNIFICANT CHANGE UP (ref 10.3–14.5)
SODIUM SERPL-SCNC: 143 MMOL/L — SIGNIFICANT CHANGE UP (ref 135–145)
WBC # BLD: 8.08 K/UL — SIGNIFICANT CHANGE UP (ref 3.8–10.5)
WBC # FLD AUTO: 8.08 K/UL — SIGNIFICANT CHANGE UP (ref 3.8–10.5)

## 2021-04-10 PROCEDURE — 93010 ELECTROCARDIOGRAM REPORT: CPT

## 2021-04-10 PROCEDURE — 99233 SBSQ HOSP IP/OBS HIGH 50: CPT

## 2021-04-10 RX ORDER — INSULIN GLARGINE 100 [IU]/ML
10 INJECTION, SOLUTION SUBCUTANEOUS AT BEDTIME
Refills: 0 | Status: DISCONTINUED | OUTPATIENT
Start: 2021-04-10 | End: 2021-04-11

## 2021-04-10 RX ORDER — INSULIN LISPRO 100/ML
2 VIAL (ML) SUBCUTANEOUS ONCE
Refills: 0 | Status: COMPLETED | OUTPATIENT
Start: 2021-04-10 | End: 2021-04-10

## 2021-04-10 RX ADMIN — REMDESIVIR 500 MILLIGRAM(S): 5 INJECTION INTRAVENOUS at 13:34

## 2021-04-10 RX ADMIN — Medication 2 UNIT(S): at 02:05

## 2021-04-10 RX ADMIN — ENOXAPARIN SODIUM 40 MILLIGRAM(S): 100 INJECTION SUBCUTANEOUS at 13:34

## 2021-04-10 RX ADMIN — INSULIN GLARGINE 10 UNIT(S): 100 INJECTION, SOLUTION SUBCUTANEOUS at 22:42

## 2021-04-10 RX ADMIN — Medication 3: at 21:44

## 2021-04-10 RX ADMIN — Medication 4: at 07:17

## 2021-04-10 RX ADMIN — Medication 6: at 13:33

## 2021-04-10 RX ADMIN — Medication 6 MILLIGRAM(S): at 06:27

## 2021-04-10 NOTE — PROGRESS NOTE ADULT - SUBJECTIVE AND OBJECTIVE BOX
[facility] Division of Hospital Medicine  Aileen Fair MD  Pager [phone]    Patient is a 55y old  Male who presents with a chief complaint of RUQ pain       SUBJECTIVE / OVERNIGHT EVENTS: pt seen this AM with NRB on; spoke with RN to titrate O2 at pt without distress and sat 100%; unclear why pt on NRB; pt states he was coughing a lot, perhaps there was a drop in sat      MEDICATIONS  (STANDING):  dexAMETHasone  Injectable 6 milliGRAM(s) IV Push daily  dextrose 40% Gel 15 Gram(s) Oral once  dextrose 5%. 1000 milliLiter(s) (50 mL/Hr) IV Continuous <Continuous>  dextrose 5%. 1000 milliLiter(s) (100 mL/Hr) IV Continuous <Continuous>  dextrose 50% Injectable 25 Gram(s) IV Push once  dextrose 50% Injectable 12.5 Gram(s) IV Push once  dextrose 50% Injectable 25 Gram(s) IV Push once  enoxaparin Injectable 40 milliGRAM(s) SubCutaneous daily  glucagon  Injectable 1 milliGRAM(s) IntraMuscular once  insulin glargine Injectable (LANTUS) 10 Unit(s) SubCutaneous at bedtime  insulin lispro (ADMELOG) corrective regimen sliding scale   SubCutaneous three times a day before meals  insulin lispro (ADMELOG) corrective regimen sliding scale   SubCutaneous at bedtime  remdesivir  IVPB   IV Intermittent   remdesivir  IVPB 100 milliGRAM(s) IV Intermittent every 24 hours    MEDICATIONS  (PRN):  acetaminophen   Tablet .. 650 milliGRAM(s) Oral every 4 hours PRN Temp greater or equal to 38C (100.4F), Mild Pain (1 - 3), Moderate Pain (4 - 6)  ALBUTerol    90 MICROgram(s) HFA Inhaler 2 Puff(s) Inhalation every 4 hours PRN Shortness of Breath and/or Wheezing      CAPILLARY BLOOD GLUCOSE  POCT Blood Glucose.: 278 mg/dL (10 Apr 2021 09:06)  POCT Blood Glucose.: 247 mg/dL (10 Apr 2021 07:10)  POCT Blood Glucose.: 303 mg/dL (10 Apr 2021 04:29)  POCT Blood Glucose.: 339 mg/dL (10 Apr 2021 01:53)  POCT Blood Glucose.: 387 mg/dL (09 Apr 2021 23:10)  POCT Blood Glucose.: 477 mg/dL (09 Apr 2021 21:17)  POCT Blood Glucose.: 393 mg/dL (09 Apr 2021 17:39)  POCT Blood Glucose.: 304 mg/dL (09 Apr 2021 13:42)        PHYSICAL EXAM:  Vital Signs Last 24 Hrs  T(F): 98.1 (10 Apr 2021 09:03), Max: 98.8 (09 Apr 2021 21:15)  HR: 50 (10 Apr 2021 09:03) (50 - 68)  BP: 112/65 (10 Apr 2021 09:03) (111/76 - 115/70)  RR: 15 (10 Apr 2021 09:03) (15 - 16)  SpO2: 95% (10 Apr 2021 09:03) (95% - 100%)    CONSTITUTIONAL: NAD  RESPIRATORY: Normal respiratory effort;  CARDIOVASCULAR: No lower extremity edema;   ABDOMEN: Nontender to palpation, soft  MUSCULOSKELETAL: no joint swelling or tenderness to palpation  PSYCH: affect appropriate  NEUROLOGY: no gross sensory deficits       LABS:                        14.3   8.08  )-----------( 208      ( 10 Apr 2021 07:09 )             42.3     04-10    143  |  110<H>  |  21  ----------------------------<  272<H>  4.2   |  23  |  0.62    Ca    9.3      10 Apr 2021 07:09  Phos  2.9     04-09  Mg     2.5     04-09    TPro  7.7  /  Alb  3.4  /  TBili  0.3  /  DBili  x   /  AST  20  /  ALT  22  /  AlkPhos  70  04-10    PT/INR - ( 09 Apr 2021 06:54 )   PT: 13.4 sec;   INR: 1.18 ratio                   Culture - Blood (collected 08 Apr 2021 02:27)  Source: .Blood Blood-Venous  Preliminary Report (09 Apr 2021 03:01):    No growth to date.    Culture - Blood (collected 08 Apr 2021 02:27)  Source: .Blood Blood-Peripheral  Preliminary Report (09 Apr 2021 03:01):    No growth to date.

## 2021-04-11 LAB
ALBUMIN SERPL ELPH-MCNC: 3.6 G/DL — SIGNIFICANT CHANGE UP (ref 3.3–5)
ALP SERPL-CCNC: 69 U/L — SIGNIFICANT CHANGE UP (ref 40–120)
ALT FLD-CCNC: 24 U/L — SIGNIFICANT CHANGE UP (ref 4–41)
ANION GAP SERPL CALC-SCNC: 13 MMOL/L — SIGNIFICANT CHANGE UP (ref 7–14)
AST SERPL-CCNC: 24 U/L — SIGNIFICANT CHANGE UP (ref 4–40)
BILIRUB SERPL-MCNC: 0.4 MG/DL — SIGNIFICANT CHANGE UP (ref 0.2–1.2)
BUN SERPL-MCNC: 25 MG/DL — HIGH (ref 7–23)
CALCIUM SERPL-MCNC: 9.5 MG/DL — SIGNIFICANT CHANGE UP (ref 8.4–10.5)
CHLORIDE SERPL-SCNC: 104 MMOL/L — SIGNIFICANT CHANGE UP (ref 98–107)
CO2 SERPL-SCNC: 23 MMOL/L — SIGNIFICANT CHANGE UP (ref 22–31)
CREAT SERPL-MCNC: 0.61 MG/DL — SIGNIFICANT CHANGE UP (ref 0.5–1.3)
CRP SERPL-MCNC: 49.6 MG/L — HIGH
D DIMER BLD IA.RAPID-MCNC: <150 NG/ML DDU — SIGNIFICANT CHANGE UP
FERRITIN SERPL-MCNC: 506 NG/ML — HIGH (ref 30–400)
GLUCOSE BLDC GLUCOMTR-MCNC: 319 MG/DL — HIGH (ref 70–99)
GLUCOSE BLDC GLUCOMTR-MCNC: 336 MG/DL — HIGH (ref 70–99)
GLUCOSE BLDC GLUCOMTR-MCNC: 347 MG/DL — HIGH (ref 70–99)
GLUCOSE BLDC GLUCOMTR-MCNC: 360 MG/DL — HIGH (ref 70–99)
GLUCOSE BLDC GLUCOMTR-MCNC: 386 MG/DL — HIGH (ref 70–99)
GLUCOSE BLDC GLUCOMTR-MCNC: 410 MG/DL — HIGH (ref 70–99)
GLUCOSE BLDC GLUCOMTR-MCNC: 482 MG/DL — CRITICAL HIGH (ref 70–99)
GLUCOSE SERPL-MCNC: 335 MG/DL — HIGH (ref 70–99)
HCT VFR BLD CALC: 44 % — SIGNIFICANT CHANGE UP (ref 39–50)
HGB BLD-MCNC: 14.2 G/DL — SIGNIFICANT CHANGE UP (ref 13–17)
LDH SERPL L TO P-CCNC: 300 U/L — HIGH (ref 135–225)
MCHC RBC-ENTMCNC: 27.4 PG — SIGNIFICANT CHANGE UP (ref 27–34)
MCHC RBC-ENTMCNC: 32.3 GM/DL — SIGNIFICANT CHANGE UP (ref 32–36)
MCV RBC AUTO: 84.9 FL — SIGNIFICANT CHANGE UP (ref 80–100)
NRBC # BLD: 0 /100 WBCS — SIGNIFICANT CHANGE UP
NRBC # FLD: 0 K/UL — SIGNIFICANT CHANGE UP
PLATELET # BLD AUTO: 240 K/UL — SIGNIFICANT CHANGE UP (ref 150–400)
POTASSIUM SERPL-MCNC: 4.2 MMOL/L — SIGNIFICANT CHANGE UP (ref 3.5–5.3)
POTASSIUM SERPL-SCNC: 4.2 MMOL/L — SIGNIFICANT CHANGE UP (ref 3.5–5.3)
PROCALCITONIN SERPL-MCNC: 0.06 NG/ML — SIGNIFICANT CHANGE UP (ref 0.02–0.1)
PROT SERPL-MCNC: 7.3 G/DL — SIGNIFICANT CHANGE UP (ref 6–8.3)
RBC # BLD: 5.18 M/UL — SIGNIFICANT CHANGE UP (ref 4.2–5.8)
RBC # FLD: 12.8 % — SIGNIFICANT CHANGE UP (ref 10.3–14.5)
SODIUM SERPL-SCNC: 140 MMOL/L — SIGNIFICANT CHANGE UP (ref 135–145)
WBC # BLD: 8.28 K/UL — SIGNIFICANT CHANGE UP (ref 3.8–10.5)
WBC # FLD AUTO: 8.28 K/UL — SIGNIFICANT CHANGE UP (ref 3.8–10.5)

## 2021-04-11 PROCEDURE — 99233 SBSQ HOSP IP/OBS HIGH 50: CPT

## 2021-04-11 RX ORDER — INSULIN GLARGINE 100 [IU]/ML
14 INJECTION, SOLUTION SUBCUTANEOUS AT BEDTIME
Refills: 0 | Status: DISCONTINUED | OUTPATIENT
Start: 2021-04-11 | End: 2021-04-12

## 2021-04-11 RX ORDER — INSULIN LISPRO 100/ML
5 VIAL (ML) SUBCUTANEOUS
Refills: 0 | Status: DISCONTINUED | OUTPATIENT
Start: 2021-04-11 | End: 2021-04-12

## 2021-04-11 RX ADMIN — ALBUTEROL 2 PUFF(S): 90 AEROSOL, METERED ORAL at 06:02

## 2021-04-11 RX ADMIN — Medication 6 MILLIGRAM(S): at 06:02

## 2021-04-11 RX ADMIN — Medication 2: at 22:00

## 2021-04-11 RX ADMIN — Medication 5 UNIT(S): at 14:53

## 2021-04-11 RX ADMIN — Medication 8: at 09:45

## 2021-04-11 RX ADMIN — ENOXAPARIN SODIUM 40 MILLIGRAM(S): 100 INJECTION SUBCUTANEOUS at 12:01

## 2021-04-11 RX ADMIN — Medication 8: at 18:18

## 2021-04-11 RX ADMIN — Medication 5 UNIT(S): at 18:18

## 2021-04-11 RX ADMIN — INSULIN GLARGINE 14 UNIT(S): 100 INJECTION, SOLUTION SUBCUTANEOUS at 23:00

## 2021-04-11 RX ADMIN — REMDESIVIR 500 MILLIGRAM(S): 5 INJECTION INTRAVENOUS at 12:02

## 2021-04-11 RX ADMIN — Medication 10: at 13:37

## 2021-04-11 NOTE — PROGRESS NOTE ADULT - SUBJECTIVE AND OBJECTIVE BOX
[facility] Division of Hospital Medicine  Aileen Fair MD  Pager [phone]    Patient is a 55y old  Male who presents with a chief complaint of RUQ pain      SUBJECTIVE / OVERNIGHT EVENTS: pain improved; breathing comfortably; d/w RN to titrate off NRB to 6L      MEDICATIONS  (STANDING):  dexAMETHasone  Injectable 6 milliGRAM(s) IV Push daily  dextrose 40% Gel 15 Gram(s) Oral once  dextrose 5%. 1000 milliLiter(s) (50 mL/Hr) IV Continuous <Continuous>  dextrose 5%. 1000 milliLiter(s) (100 mL/Hr) IV Continuous <Continuous>  dextrose 50% Injectable 25 Gram(s) IV Push once  dextrose 50% Injectable 12.5 Gram(s) IV Push once  dextrose 50% Injectable 25 Gram(s) IV Push once  enoxaparin Injectable 40 milliGRAM(s) SubCutaneous daily  glucagon  Injectable 1 milliGRAM(s) IntraMuscular once  insulin glargine Injectable (LANTUS) 14 Unit(s) SubCutaneous at bedtime  insulin lispro (ADMELOG) corrective regimen sliding scale   SubCutaneous three times a day before meals  insulin lispro (ADMELOG) corrective regimen sliding scale   SubCutaneous at bedtime  insulin lispro Injectable (ADMELOG) 5 Unit(s) SubCutaneous three times a day before meals  remdesivir  IVPB   IV Intermittent   remdesivir  IVPB 100 milliGRAM(s) IV Intermittent every 24 hours    MEDICATIONS  (PRN):  acetaminophen   Tablet .. 650 milliGRAM(s) Oral every 4 hours PRN Temp greater or equal to 38C (100.4F), Mild Pain (1 - 3), Moderate Pain (4 - 6)  ALBUTerol    90 MICROgram(s) HFA Inhaler 2 Puff(s) Inhalation every 4 hours PRN Shortness of Breath and/or Wheezing      CAPILLARY BLOOD GLUCOSE  POCT Blood Glucose.: 386 mg/dL (11 Apr 2021 13:30)  POCT Blood Glucose.: 347 mg/dL (11 Apr 2021 09:32)  POCT Blood Glucose.: 399 mg/dL (10 Apr 2021 21:26)  POCT Blood Glucose.: 325 mg/dL (10 Apr 2021 18:00)        PHYSICAL EXAM:  Vital Signs Last 24 Hrs  T(F): 98.4 (11 Apr 2021 11:59), Max: 99.1 (10 Apr 2021 21:44)  HR: 61 (11 Apr 2021 11:59) (58 - 66)  BP: 116/67 (11 Apr 2021 11:59) (110/72 - 121/66)  RR: 19 (11 Apr 2021 11:59) (16 - 19)  SpO2: 95% (11 Apr 2021 11:59) (91% - 95%)    CONSTITUTIONAL: NAD  RESPIRATORY: Normal respiratory effort;  CARDIOVASCULAR: No lower extremity edema;  ABDOMEN: Nontender to palpation  MUSCULOSKELETAL:  no clubbing or cyanosis of digits;  PSYCH: A+O to person, place, and time; affect appropriate  NEUROLOGY: no gross sensory deficits       LABS:                        14.2   8.28  )-----------( 240      ( 11 Apr 2021 08:25 )             44.0     04-11    140  |  104  |  25<H>  ----------------------------<  335<H>  4.2   |  23  |  0.61    Ca    9.5      11 Apr 2021 08:25    TPro  7.3  /  Alb  3.6  /  TBili  0.4  /  DBili  x   /  AST  24  /  ALT  24  /  AlkPhos  69  04-11

## 2021-04-11 NOTE — PROGRESS NOTE ADULT - PROBLEM SELECTOR PLAN 4
inc lantus for tonight as pt with elevated gluc due to steroids  meal coverage added  monitor closely  FS QID  pt in steroid study

## 2021-04-11 NOTE — PROGRESS NOTE ADULT - PROBLEM SELECTOR PLAN 1
supplemental O2 as needed, titrate as tolerated, currently on 6L  cont remdesivir, dexa  incentive spirometry  chest PT

## 2021-04-12 DIAGNOSIS — I10 ESSENTIAL (PRIMARY) HYPERTENSION: ICD-10-CM

## 2021-04-12 DIAGNOSIS — E11.65 TYPE 2 DIABETES MELLITUS WITH HYPERGLYCEMIA: ICD-10-CM

## 2021-04-12 DIAGNOSIS — R73.9 HYPERGLYCEMIA, UNSPECIFIED: ICD-10-CM

## 2021-04-12 LAB
ALBUMIN SERPL ELPH-MCNC: 3.6 G/DL — SIGNIFICANT CHANGE UP (ref 3.3–5)
ALP SERPL-CCNC: 72 U/L — SIGNIFICANT CHANGE UP (ref 40–120)
ALT FLD-CCNC: 27 U/L — SIGNIFICANT CHANGE UP (ref 4–41)
ANION GAP SERPL CALC-SCNC: 14 MMOL/L — SIGNIFICANT CHANGE UP (ref 7–14)
AST SERPL-CCNC: 26 U/L — SIGNIFICANT CHANGE UP (ref 4–40)
BASOPHILS # BLD AUTO: 0.01 K/UL — SIGNIFICANT CHANGE UP (ref 0–0.2)
BASOPHILS NFR BLD AUTO: 0.1 % — SIGNIFICANT CHANGE UP (ref 0–2)
BILIRUB SERPL-MCNC: 0.4 MG/DL — SIGNIFICANT CHANGE UP (ref 0.2–1.2)
BUN SERPL-MCNC: 29 MG/DL — HIGH (ref 7–23)
CALCIUM SERPL-MCNC: 9.3 MG/DL — SIGNIFICANT CHANGE UP (ref 8.4–10.5)
CHLORIDE SERPL-SCNC: 103 MMOL/L — SIGNIFICANT CHANGE UP (ref 98–107)
CO2 SERPL-SCNC: 22 MMOL/L — SIGNIFICANT CHANGE UP (ref 22–31)
CREAT SERPL-MCNC: 0.65 MG/DL — SIGNIFICANT CHANGE UP (ref 0.5–1.3)
EOSINOPHIL # BLD AUTO: 0 K/UL — SIGNIFICANT CHANGE UP (ref 0–0.5)
EOSINOPHIL NFR BLD AUTO: 0 % — SIGNIFICANT CHANGE UP (ref 0–6)
GLUCOSE BLDC GLUCOMTR-MCNC: 338 MG/DL — HIGH (ref 70–99)
GLUCOSE SERPL-MCNC: 334 MG/DL — HIGH (ref 70–99)
HCT VFR BLD CALC: 42.6 % — SIGNIFICANT CHANGE UP (ref 39–50)
HGB BLD-MCNC: 13.9 G/DL — SIGNIFICANT CHANGE UP (ref 13–17)
IANC: 7.14 K/UL — SIGNIFICANT CHANGE UP (ref 1.5–8.5)
IMM GRANULOCYTES NFR BLD AUTO: 0.5 % — SIGNIFICANT CHANGE UP (ref 0–1.5)
LYMPHOCYTES # BLD AUTO: 1.21 K/UL — SIGNIFICANT CHANGE UP (ref 1–3.3)
LYMPHOCYTES # BLD AUTO: 13.7 % — SIGNIFICANT CHANGE UP (ref 13–44)
MAGNESIUM SERPL-MCNC: 2.4 MG/DL — SIGNIFICANT CHANGE UP (ref 1.6–2.6)
MCHC RBC-ENTMCNC: 27.3 PG — SIGNIFICANT CHANGE UP (ref 27–34)
MCHC RBC-ENTMCNC: 32.6 GM/DL — SIGNIFICANT CHANGE UP (ref 32–36)
MCV RBC AUTO: 83.7 FL — SIGNIFICANT CHANGE UP (ref 80–100)
MONOCYTES # BLD AUTO: 0.45 K/UL — SIGNIFICANT CHANGE UP (ref 0–0.9)
MONOCYTES NFR BLD AUTO: 5.1 % — SIGNIFICANT CHANGE UP (ref 2–14)
NEUTROPHILS # BLD AUTO: 7.14 K/UL — SIGNIFICANT CHANGE UP (ref 1.8–7.4)
NEUTROPHILS NFR BLD AUTO: 80.6 % — HIGH (ref 43–77)
NRBC # BLD: 0 /100 WBCS — SIGNIFICANT CHANGE UP
NRBC # FLD: 0 K/UL — SIGNIFICANT CHANGE UP
PHOSPHATE SERPL-MCNC: 3.7 MG/DL — SIGNIFICANT CHANGE UP (ref 2.5–4.5)
PLATELET # BLD AUTO: 256 K/UL — SIGNIFICANT CHANGE UP (ref 150–400)
POTASSIUM SERPL-MCNC: 4.6 MMOL/L — SIGNIFICANT CHANGE UP (ref 3.5–5.3)
POTASSIUM SERPL-SCNC: 4.6 MMOL/L — SIGNIFICANT CHANGE UP (ref 3.5–5.3)
PROT SERPL-MCNC: 7.3 G/DL — SIGNIFICANT CHANGE UP (ref 6–8.3)
RBC # BLD: 5.09 M/UL — SIGNIFICANT CHANGE UP (ref 4.2–5.8)
RBC # FLD: 12.6 % — SIGNIFICANT CHANGE UP (ref 10.3–14.5)
SODIUM SERPL-SCNC: 139 MMOL/L — SIGNIFICANT CHANGE UP (ref 135–145)
WBC # BLD: 8.85 K/UL — SIGNIFICANT CHANGE UP (ref 3.8–10.5)
WBC # FLD AUTO: 8.85 K/UL — SIGNIFICANT CHANGE UP (ref 3.8–10.5)

## 2021-04-12 PROCEDURE — 99233 SBSQ HOSP IP/OBS HIGH 50: CPT

## 2021-04-12 PROCEDURE — 99223 1ST HOSP IP/OBS HIGH 75: CPT

## 2021-04-12 RX ORDER — INSULIN LISPRO 100/ML
14 VIAL (ML) SUBCUTANEOUS
Refills: 0 | Status: DISCONTINUED | OUTPATIENT
Start: 2021-04-12 | End: 2021-04-13

## 2021-04-12 RX ORDER — INSULIN LISPRO 100/ML
14 VIAL (ML) SUBCUTANEOUS
Refills: 0 | Status: DISCONTINUED | OUTPATIENT
Start: 2021-04-13 | End: 2021-04-13

## 2021-04-12 RX ORDER — INSULIN GLARGINE 100 [IU]/ML
24 INJECTION, SOLUTION SUBCUTANEOUS AT BEDTIME
Refills: 0 | Status: DISCONTINUED | OUTPATIENT
Start: 2021-04-12 | End: 2021-04-13

## 2021-04-12 RX ORDER — INSULIN LISPRO 100/ML
10 VIAL (ML) SUBCUTANEOUS
Refills: 0 | Status: DISCONTINUED | OUTPATIENT
Start: 2021-04-12 | End: 2021-04-12

## 2021-04-12 RX ORDER — INSULIN GLARGINE 100 [IU]/ML
20 INJECTION, SOLUTION SUBCUTANEOUS AT BEDTIME
Refills: 0 | Status: DISCONTINUED | OUTPATIENT
Start: 2021-04-12 | End: 2021-04-12

## 2021-04-12 RX ADMIN — INSULIN GLARGINE 24 UNIT(S): 100 INJECTION, SOLUTION SUBCUTANEOUS at 22:00

## 2021-04-12 RX ADMIN — Medication 8: at 18:13

## 2021-04-12 RX ADMIN — Medication 6 MILLIGRAM(S): at 06:27

## 2021-04-12 RX ADMIN — Medication 8: at 09:47

## 2021-04-12 RX ADMIN — Medication 14 UNIT(S): at 18:13

## 2021-04-12 RX ADMIN — ENOXAPARIN SODIUM 40 MILLIGRAM(S): 100 INJECTION SUBCUTANEOUS at 11:29

## 2021-04-12 RX ADMIN — REMDESIVIR 500 MILLIGRAM(S): 5 INJECTION INTRAVENOUS at 12:39

## 2021-04-12 RX ADMIN — Medication 12: at 13:29

## 2021-04-12 RX ADMIN — Medication 5 UNIT(S): at 09:48

## 2021-04-12 RX ADMIN — Medication 10 UNIT(S): at 13:30

## 2021-04-12 NOTE — PROGRESS NOTE ADULT - PROBLEM SELECTOR PLAN 4
Uncontrolled, increase lantus to 20u hs and admelog 10u actid  endocrine consult (emailed), f/u recs  hyperglycemia likely d/t steroids  monitor FS closely  pt in steroid study Uncontrolled, increase lantus to 20u hs and admelog 10u actid  endocrine consult (emailed), f/u recs  hyperglycemia likely d/t steroids  A1c 7.1  monitor FS closely  pt in steroid study

## 2021-04-12 NOTE — CONSULT NOTE ADULT - SUBJECTIVE AND OBJECTIVE BOX
HPI:  Patient is a 54 y/o M PMH HLD, DM2 p/w RUQ pain X2 day. Reports pain is intermittent,  dull, non-radiating, worsened with food, associated w/ nausea, no vomiting. Reports pain resolved in the ED, however, has developed a non-productive cough while in the ED. No other symptoms, no sick contacts, no recent travel.  Seen at Urgent Care 4/7 ~16:00, COVID neg, discharged w/ Abx (08 Apr 2021 05:12)      PAST MEDICAL & SURGICAL HISTORY:  No pertinent past medical history    Diabetes mellitus type 2, noninsulin dependent    Hyperlipidemia    No significant past surgical history        FAMILY HISTORY:  No pertinent family history in first degree relatives        Social History:    Outpatient Medications:    MEDICATIONS  (STANDING):  dexAMETHasone  Injectable 6 milliGRAM(s) IV Push daily  dextrose 40% Gel 15 Gram(s) Oral once  dextrose 5%. 1000 milliLiter(s) (50 mL/Hr) IV Continuous <Continuous>  dextrose 5%. 1000 milliLiter(s) (100 mL/Hr) IV Continuous <Continuous>  dextrose 50% Injectable 25 Gram(s) IV Push once  dextrose 50% Injectable 12.5 Gram(s) IV Push once  dextrose 50% Injectable 25 Gram(s) IV Push once  enoxaparin Injectable 40 milliGRAM(s) SubCutaneous daily  glucagon  Injectable 1 milliGRAM(s) IntraMuscular once  insulin glargine Injectable (LANTUS) 20 Unit(s) SubCutaneous at bedtime  insulin lispro (ADMELOG) corrective regimen sliding scale   SubCutaneous three times a day before meals  insulin lispro (ADMELOG) corrective regimen sliding scale   SubCutaneous at bedtime  insulin lispro Injectable (ADMELOG) 10 Unit(s) SubCutaneous three times a day before meals    MEDICATIONS  (PRN):  acetaminophen   Tablet .. 650 milliGRAM(s) Oral every 4 hours PRN Temp greater or equal to 38C (100.4F), Mild Pain (1 - 3), Moderate Pain (4 - 6)  ALBUTerol    90 MICROgram(s) HFA Inhaler 2 Puff(s) Inhalation every 4 hours PRN Shortness of Breath and/or Wheezing      Allergies    No Known Allergies    Intolerances      Review of Systems:  Constitutional: No fever  Eyes: No blurry vision  Neuro: No tremors  HEENT: No pain  Cardiovascular: No chest pain, palpitations  Respiratory: No SOB, no cough  GI: No nausea, vomiting, abdominal pain  : No dysuria  Skin: no rash  Psych: no depression  Endocrine: no polyuria, polydipsia  Hem/lymph: no swelling  Osteoporosis: no fractures    ALL OTHER SYSTEMS REVIEWED AND NEGATIVE    UNABLE TO OBTAIN    PHYSICAL EXAM:  VITALS: T(C): 36.6 (04-12-21 @ 09:45)  T(F): 97.9 (04-12-21 @ 09:45), Max: 98.4 (04-11-21 @ 23:00)  HR: 57 (04-12-21 @ 09:45) (50 - 59)  BP: 117/70 (04-12-21 @ 09:45) (111/71 - 117/70)  RR:  (18 - 18)  SpO2:  (90% - 95%)  Wt(kg): --  GENERAL: NAD, well-groomed, well-developed  EYES: No proptosis, no lid lag, anicteric  HEENT:  Atraumatic, Normocephalic, moist mucous membranes  THYROID: Normal size, no palpable nodules  RESPIRATORY: + air movement bilaterally, no respiratory distress,   CARDIOVASCULAR: Regular rate and rhythm; no peripheral edema  GI: Soft, nontender, non distended, normal bowel sounds  SKIN: Dry, intact, No ulcers on feet  MUSCULOSKELETAL: Full range of motion, normal strength  PSYCH: Alert and oriented x 3, reactive affect, euthymic mood       POCT Blood Glucose.: 372 mg/dL (04-12-21 @ 13:18)  POCT Blood Glucose.: 416 mg/dL (04-12-21 @ 13:18)  POCT Blood Glucose.: 338 mg/dL (04-12-21 @ 09:11)  POCT Blood Glucose.: 319 mg/dL (04-11-21 @ 21:18)  POCT Blood Glucose.: 336 mg/dL (04-11-21 @ 17:57)  POCT Blood Glucose.: 360 mg/dL (04-11-21 @ 16:04)  POCT Blood Glucose.: 410 mg/dL (04-11-21 @ 14:52)  POCT Blood Glucose.: 482 mg/dL (04-11-21 @ 14:50)  POCT Blood Glucose.: 386 mg/dL (04-11-21 @ 13:30)  POCT Blood Glucose.: 347 mg/dL (04-11-21 @ 09:32)  POCT Blood Glucose.: 399 mg/dL (04-10-21 @ 21:26)  POCT Blood Glucose.: 325 mg/dL (04-10-21 @ 18:00)  POCT Blood Glucose.: 295 mg/dL (04-10-21 @ 13:04)  POCT Blood Glucose.: 278 mg/dL (04-10-21 @ 09:06)  POCT Blood Glucose.: 247 mg/dL (04-10-21 @ 07:10)  POCT Blood Glucose.: 303 mg/dL (04-10-21 @ 04:29)  POCT Blood Glucose.: 339 mg/dL (04-10-21 @ 01:53)  POCT Blood Glucose.: 387 mg/dL (04-09-21 @ 23:10)  POCT Blood Glucose.: 477 mg/dL (04-09-21 @ 21:17)  POCT Blood Glucose.: 393 mg/dL (04-09-21 @ 17:39)                          13.9   8.85  )-----------( 256      ( 12 Apr 2021 07:44 )             42.6       04-12    139  |  103  |  29<H>  ----------------------------<  334<H>  4.6   |  22  |  0.65    EGFR if : 129  EGFR if non : 111    Ca    9.3      04-12  Mg     2.4     04-12  Phos  3.7     04-12    TPro  7.3  /  Alb  3.6  /  TBili  0.4  /  DBili  x   /  AST  26  /  ALT  27  /  AlkPhos  72  04-12      A1c 7.1%             HPI:  Patient is a 55 year old man with HTN, HLD, DM2, a1c 7.1%, presents with RUQ pain, found to have COVID, now on oxygen and dexmethasone. Consult called for management of DM2 and steroid induced hyperglycemia. Patient seen at bedside, states that he was diagnosed with DM2 about 5 years ago. Takes Prandin 1 mg before breakfast, and sometimes takes 1 mg before dinner as well, depending on his BG and what he eats. He checks BG 1-2 times a day, notes that his AM fasting BG is usually in the low 100's to 120's, rarely higher than the 120's. He does not have sx of neuropathy. No known nephropathy. Saw optho in 1/2021, he is not aware of any retinopathy. Tolerating po here. At home, states that he doesn't really drink soda or juice, mostly drinks water and seltzer. BG up to the 400's here.     PAST MEDICAL & SURGICAL HISTORY:  No pertinent past medical history    Diabetes mellitus type 2, noninsulin dependent    Hyperlipidemia    No significant past surgical history      FAMILY HISTORY:  DM2 in father     Social History:  no cigarette use  no alcohol use  lives with family     Outpatient Medications:  Prandin 1 mg before breakfast and dinner    MEDICATIONS  (STANDING):  dexAMETHasone  Injectable 6 milliGRAM(s) IV Push daily  dextrose 40% Gel 15 Gram(s) Oral once  dextrose 5%. 1000 milliLiter(s) (50 mL/Hr) IV Continuous <Continuous>  dextrose 5%. 1000 milliLiter(s) (100 mL/Hr) IV Continuous <Continuous>  dextrose 50% Injectable 25 Gram(s) IV Push once  dextrose 50% Injectable 12.5 Gram(s) IV Push once  dextrose 50% Injectable 25 Gram(s) IV Push once  enoxaparin Injectable 40 milliGRAM(s) SubCutaneous daily  glucagon  Injectable 1 milliGRAM(s) IntraMuscular once  insulin glargine Injectable (LANTUS) 20 Unit(s) SubCutaneous at bedtime  insulin lispro (ADMELOG) corrective regimen sliding scale   SubCutaneous three times a day before meals  insulin lispro (ADMELOG) corrective regimen sliding scale   SubCutaneous at bedtime  insulin lispro Injectable (ADMELOG) 10 Unit(s) SubCutaneous three times a day before meals    MEDICATIONS  (PRN):  acetaminophen   Tablet .. 650 milliGRAM(s) Oral every 4 hours PRN Temp greater or equal to 38C (100.4F), Mild Pain (1 - 3), Moderate Pain (4 - 6)  ALBUTerol    90 MICROgram(s) HFA Inhaler 2 Puff(s) Inhalation every 4 hours PRN Shortness of Breath and/or Wheezing      Allergies  No Known Allergies    Review of Systems:  Constitutional: No fever  Eyes: No blurry vision  Neuro: No tremors  HEENT: No pain  Cardiovascular: No chest pain, palpitations  Respiratory: + SOB but improved, + cough  GI: No nausea, vomiting, abdominal pain  : No dysuria  Skin: no rash  Endocrine: no polyuria, polydipsia    ALL OTHER SYSTEMS REVIEWED AND NEGATIVE      PHYSICAL EXAM:  VITALS: T(C): 36.6 (04-12-21 @ 09:45)  T(F): 97.9 (04-12-21 @ 09:45), Max: 98.4 (04-11-21 @ 23:00)  HR: 57 (04-12-21 @ 09:45) (50 - 59)  BP: 117/70 (04-12-21 @ 09:45) (111/71 - 117/70)  RR:  (18 - 18)  SpO2:  (90% - 95%)  Wt(kg): --  GENERAL: NAD, well-developed  EYES: No proptosis, anicteric  HEENT:  Atraumatic, Normocephalic, wearing nasal cannula   THYROID: Normal size, no palpable nodules  RESPIRATORY: + air movement bilaterally, no respiratory distress, no audible wheezing   CARDIOVASCULAR: Regular rate and rhythm; no peripheral edema  GI: Soft, nontender, non distended, normal bowel sounds  SKIN: Dry, intact, No ulcers on feet  MUSCULOSKELETAL: Full range of motion, normal strength  PSYCH: Alert and oriented x 3, reactive affect, euthymic mood     POCT Blood Glucose.: 372 mg/dL (04-12-21 @ 13:18)  POCT Blood Glucose.: 416 mg/dL (04-12-21 @ 13:18) A 10, A 12  POCT Blood Glucose.: 338 mg/dL (04-12-21 @ 09:11) A 5, A 8  POCT Blood Glucose.: 319 mg/dL (04-11-21 @ 21:18) L 14, A 2   POCT Blood Glucose.: 336 mg/dL (04-11-21 @ 17:57) A 5, A 8  POCT Blood Glucose.: 360 mg/dL (04-11-21 @ 16:04)  POCT Blood Glucose.: 410 mg/dL (04-11-21 @ 14:52) A 5  POCT Blood Glucose.: 482 mg/dL (04-11-21 @ 14:50)  POCT Blood Glucose.: 386 mg/dL (04-11-21 @ 13:30) A 10  POCT Blood Glucose.: 347 mg/dL (04-11-21 @ 09:32) A 8  POCT Blood Glucose.: 399 mg/dL (04-10-21 @ 21:26)  POCT Blood Glucose.: 325 mg/dL (04-10-21 @ 18:00)  POCT Blood Glucose.: 295 mg/dL (04-10-21 @ 13:04)  POCT Blood Glucose.: 278 mg/dL (04-10-21 @ 09:06)  POCT Blood Glucose.: 247 mg/dL (04-10-21 @ 07:10)  POCT Blood Glucose.: 303 mg/dL (04-10-21 @ 04:29)  POCT Blood Glucose.: 339 mg/dL (04-10-21 @ 01:53)  POCT Blood Glucose.: 387 mg/dL (04-09-21 @ 23:10)  POCT Blood Glucose.: 477 mg/dL (04-09-21 @ 21:17)  POCT Blood Glucose.: 393 mg/dL (04-09-21 @ 17:39)                          13.9   8.85  )-----------( 256      ( 12 Apr 2021 07:44 )             42.6       04-12    139  |  103  |  29<H>  ----------------------------<  334<H>  4.6   |  22  |  0.65    EGFR if : 129  EGFR if non : 111    Ca    9.3      04-12  Mg     2.4     04-12  Phos  3.7     04-12    TPro  7.3  /  Alb  3.6  /  TBili  0.4  /  DBili  x   /  AST  26  /  ALT  27  /  AlkPhos  72  04-12      A1c 7.1%

## 2021-04-12 NOTE — CONSULT NOTE ADULT - PROBLEM SELECTOR RECOMMENDATION 9
- on dexamethasone for COVID with BG up to the 400's  - increase Lantus to 24 units qhs  - increase Admelog to 14 units before meals  - moderate correction scale qac and qhs  - consistent carb diet  - check FS qac and qhs  - will follow  - notify endocrine service of any changes to steroid plan

## 2021-04-12 NOTE — PROGRESS NOTE ADULT - SUBJECTIVE AND OBJECTIVE BOX
Dr. Taylor Graves  Pager [phone]    PROGRESS NOTE:     Patient is a 55y old  Male who presents with a chief complaint of RUQ pain (11 Apr 2021 14:20)      SUBJECTIVE / OVERNIGHT EVENTS: pt reports diabetes fairly controlled at home, breathing okay  ADDITIONAL REVIEW OF SYSTEMS: afebrile, on 6L O2    MEDICATIONS  (STANDING):  dexAMETHasone  Injectable 6 milliGRAM(s) IV Push daily  dextrose 40% Gel 15 Gram(s) Oral once  dextrose 5%. 1000 milliLiter(s) (50 mL/Hr) IV Continuous <Continuous>  dextrose 5%. 1000 milliLiter(s) (100 mL/Hr) IV Continuous <Continuous>  dextrose 50% Injectable 25 Gram(s) IV Push once  dextrose 50% Injectable 12.5 Gram(s) IV Push once  dextrose 50% Injectable 25 Gram(s) IV Push once  enoxaparin Injectable 40 milliGRAM(s) SubCutaneous daily  glucagon  Injectable 1 milliGRAM(s) IntraMuscular once  insulin glargine Injectable (LANTUS) 20 Unit(s) SubCutaneous at bedtime  insulin lispro (ADMELOG) corrective regimen sliding scale   SubCutaneous three times a day before meals  insulin lispro (ADMELOG) corrective regimen sliding scale   SubCutaneous at bedtime  insulin lispro Injectable (ADMELOG) 10 Unit(s) SubCutaneous three times a day before meals    MEDICATIONS  (PRN):  acetaminophen   Tablet .. 650 milliGRAM(s) Oral every 4 hours PRN Temp greater or equal to 38C (100.4F), Mild Pain (1 - 3), Moderate Pain (4 - 6)  ALBUTerol    90 MICROgram(s) HFA Inhaler 2 Puff(s) Inhalation every 4 hours PRN Shortness of Breath and/or Wheezing      CAPILLARY BLOOD GLUCOSE      POCT Blood Glucose.: 372 mg/dL (12 Apr 2021 13:18)  POCT Blood Glucose.: 416 mg/dL (12 Apr 2021 13:18)  POCT Blood Glucose.: 338 mg/dL (12 Apr 2021 09:11)  POCT Blood Glucose.: 319 mg/dL (11 Apr 2021 21:18)  POCT Blood Glucose.: 336 mg/dL (11 Apr 2021 17:57)  POCT Blood Glucose.: 360 mg/dL (11 Apr 2021 16:04)    I&O's Summary      PHYSICAL EXAM:  Vital Signs Last 24 Hrs  T(C): 36.6 (12 Apr 2021 09:45), Max: 36.9 (11 Apr 2021 23:00)  T(F): 97.9 (12 Apr 2021 09:45), Max: 98.4 (11 Apr 2021 23:00)  HR: 57 (12 Apr 2021 09:45) (50 - 59)  BP: 117/70 (12 Apr 2021 09:45) (111/71 - 117/70)  BP(mean): --  RR: 18 (12 Apr 2021 09:45) (18 - 18)  SpO2: 95% (12 Apr 2021 09:45) (90% - 95%)  CONSTITUTIONAL: NAD, well-developed  RESPIRATORY: Normal respiratory effort; lungs are clear to auscultation bilaterally  CARDIOVASCULAR: Regular rate and rhythm, normal S1 and S2, no murmur/rub/gallop; No lower extremity edema; Peripheral pulses are 2+ bilaterally  ABDOMEN: Nontender to palpation, normoactive bowel sounds, no rebound/guarding; No hepatosplenomegaly  MUSCULOSKELETAL: no clubbing or cyanosis of digits; no joint swelling or tenderness to palpation  PSYCH: A+O to person, place, and time; affect appropriate    LABS:                        13.9   8.85  )-----------( 256      ( 12 Apr 2021 07:44 )             42.6     04-12    139  |  103  |  29<H>  ----------------------------<  334<H>  4.6   |  22  |  0.65    Ca    9.3      12 Apr 2021 07:44  Phos  3.7     04-12  Mg     2.4     04-12    TPro  7.3  /  Alb  3.6  /  TBili  0.4  /  DBili  x   /  AST  26  /  ALT  27  /  AlkPhos  72  04-12      RADIOLOGY & ADDITIONAL TESTS:  Results Reviewed:   Imaging Personally Reviewed:  Electrocardiogram Personally Reviewed:    COORDINATION OF CARE:  Care Discussed with Consultants/Other Providers [Y/N]: anastacio Corral, endocrine consult  Prior or Outpatient Records Reviewed [Y/N]:

## 2021-04-12 NOTE — PROGRESS NOTE ADULT - PROBLEM SELECTOR PLAN 3
outpt f/u  nl LFT  fatty liver on sono outpt f/u  nl LFT  fatty liver on sono  check lipid profile in AM

## 2021-04-12 NOTE — PROVIDER CONTACT NOTE (OTHER) - ACTION/TREATMENT ORDERED:
Continue with administration of night time dose of lantus 24 units and give 4 units of extra coverage. Recheck blood sugar at 0100.

## 2021-04-12 NOTE — CONSULT NOTE ADULT - ASSESSMENT
55 year old man with HTN, HLD, DM2, a1c 7.1%, presents with RUQ pain, found to have COVID, now on oxygen and dexmethasone. Consult called for management of DM2 and steroid induced hyperglycemia, with BG up to the 400's. High risk patient with high level decision making.

## 2021-04-12 NOTE — PROGRESS NOTE ADULT - PROBLEM SELECTOR PLAN 1
supplemental O2 as needed, titrate as tolerated, currently on 6L  completed 5 doses of remdesivir 4/12, c/w dexamethasone 6mg qd x10 doses through 4/18  incentive spirometry  chest PT

## 2021-04-12 NOTE — CONSULT NOTE ADULT - PROBLEM SELECTOR RECOMMENDATION 4
- check lipid panel  - should be treated with statin such as Lipitor 20 mg qhs as long as LDL > 70 and there is no contraindication

## 2021-04-13 LAB
ALBUMIN SERPL ELPH-MCNC: 3.5 G/DL — SIGNIFICANT CHANGE UP (ref 3.3–5)
ALP SERPL-CCNC: 69 U/L — SIGNIFICANT CHANGE UP (ref 40–120)
ALT FLD-CCNC: 24 U/L — SIGNIFICANT CHANGE UP (ref 4–41)
ANION GAP SERPL CALC-SCNC: 13 MMOL/L — SIGNIFICANT CHANGE UP (ref 7–14)
AST SERPL-CCNC: 17 U/L — SIGNIFICANT CHANGE UP (ref 4–40)
BASOPHILS # BLD AUTO: 0 K/UL — SIGNIFICANT CHANGE UP (ref 0–0.2)
BASOPHILS NFR BLD AUTO: 0 % — SIGNIFICANT CHANGE UP (ref 0–2)
BILIRUB SERPL-MCNC: 0.4 MG/DL — SIGNIFICANT CHANGE UP (ref 0.2–1.2)
BUN SERPL-MCNC: 25 MG/DL — HIGH (ref 7–23)
CALCIUM SERPL-MCNC: 9.3 MG/DL — SIGNIFICANT CHANGE UP (ref 8.4–10.5)
CHLORIDE SERPL-SCNC: 104 MMOL/L — SIGNIFICANT CHANGE UP (ref 98–107)
CHOLEST SERPL-MCNC: 145 MG/DL — SIGNIFICANT CHANGE UP
CO2 SERPL-SCNC: 23 MMOL/L — SIGNIFICANT CHANGE UP (ref 22–31)
CREAT SERPL-MCNC: 0.6 MG/DL — SIGNIFICANT CHANGE UP (ref 0.5–1.3)
CULTURE RESULTS: SIGNIFICANT CHANGE UP
CULTURE RESULTS: SIGNIFICANT CHANGE UP
EOSINOPHIL # BLD AUTO: 0.02 K/UL — SIGNIFICANT CHANGE UP (ref 0–0.5)
EOSINOPHIL NFR BLD AUTO: 0.2 % — SIGNIFICANT CHANGE UP (ref 0–6)
GLUCOSE SERPL-MCNC: 202 MG/DL — HIGH (ref 70–99)
HCT VFR BLD CALC: 43.5 % — SIGNIFICANT CHANGE UP (ref 39–50)
HDLC SERPL-MCNC: 39 MG/DL — LOW
HGB BLD-MCNC: 14.7 G/DL — SIGNIFICANT CHANGE UP (ref 13–17)
IANC: 8.72 K/UL — HIGH (ref 1.5–8.5)
IMM GRANULOCYTES NFR BLD AUTO: 0.6 % — SIGNIFICANT CHANGE UP (ref 0–1.5)
LIPID PNL WITH DIRECT LDL SERPL: 91 MG/DL — SIGNIFICANT CHANGE UP
LYMPHOCYTES # BLD AUTO: 1.51 K/UL — SIGNIFICANT CHANGE UP (ref 1–3.3)
LYMPHOCYTES # BLD AUTO: 13.9 % — SIGNIFICANT CHANGE UP (ref 13–44)
MAGNESIUM SERPL-MCNC: 2.4 MG/DL — SIGNIFICANT CHANGE UP (ref 1.6–2.6)
MCHC RBC-ENTMCNC: 27.4 PG — SIGNIFICANT CHANGE UP (ref 27–34)
MCHC RBC-ENTMCNC: 33.8 GM/DL — SIGNIFICANT CHANGE UP (ref 32–36)
MCV RBC AUTO: 81.2 FL — SIGNIFICANT CHANGE UP (ref 80–100)
MONOCYTES # BLD AUTO: 0.56 K/UL — SIGNIFICANT CHANGE UP (ref 0–0.9)
MONOCYTES NFR BLD AUTO: 5.1 % — SIGNIFICANT CHANGE UP (ref 2–14)
NEUTROPHILS # BLD AUTO: 8.72 K/UL — HIGH (ref 1.8–7.4)
NEUTROPHILS NFR BLD AUTO: 80.2 % — HIGH (ref 43–77)
NON HDL CHOLESTEROL: 106 MG/DL — SIGNIFICANT CHANGE UP
NRBC # BLD: 0 /100 WBCS — SIGNIFICANT CHANGE UP
NRBC # FLD: 0 K/UL — SIGNIFICANT CHANGE UP
PHOSPHATE SERPL-MCNC: 3 MG/DL — SIGNIFICANT CHANGE UP (ref 2.5–4.5)
PLATELET # BLD AUTO: 317 K/UL — SIGNIFICANT CHANGE UP (ref 150–400)
POTASSIUM SERPL-MCNC: 4.1 MMOL/L — SIGNIFICANT CHANGE UP (ref 3.5–5.3)
POTASSIUM SERPL-SCNC: 4.1 MMOL/L — SIGNIFICANT CHANGE UP (ref 3.5–5.3)
PROT SERPL-MCNC: 7.3 G/DL — SIGNIFICANT CHANGE UP (ref 6–8.3)
RBC # BLD: 5.36 M/UL — SIGNIFICANT CHANGE UP (ref 4.2–5.8)
RBC # FLD: 12.3 % — SIGNIFICANT CHANGE UP (ref 10.3–14.5)
SODIUM SERPL-SCNC: 140 MMOL/L — SIGNIFICANT CHANGE UP (ref 135–145)
SPECIMEN SOURCE: SIGNIFICANT CHANGE UP
SPECIMEN SOURCE: SIGNIFICANT CHANGE UP
TRIGL SERPL-MCNC: 75 MG/DL — SIGNIFICANT CHANGE UP
WBC # BLD: 10.88 K/UL — HIGH (ref 3.8–10.5)
WBC # FLD AUTO: 10.88 K/UL — HIGH (ref 3.8–10.5)

## 2021-04-13 PROCEDURE — 99232 SBSQ HOSP IP/OBS MODERATE 35: CPT

## 2021-04-13 PROCEDURE — 99233 SBSQ HOSP IP/OBS HIGH 50: CPT

## 2021-04-13 RX ORDER — INSULIN GLARGINE 100 [IU]/ML
30 INJECTION, SOLUTION SUBCUTANEOUS AT BEDTIME
Refills: 0 | Status: DISCONTINUED | OUTPATIENT
Start: 2021-04-13 | End: 2021-04-14

## 2021-04-13 RX ORDER — INSULIN LISPRO 100/ML
20 VIAL (ML) SUBCUTANEOUS
Refills: 0 | Status: DISCONTINUED | OUTPATIENT
Start: 2021-04-13 | End: 2021-04-14

## 2021-04-13 RX ORDER — ATORVASTATIN CALCIUM 80 MG/1
10 TABLET, FILM COATED ORAL AT BEDTIME
Refills: 0 | Status: DISCONTINUED | OUTPATIENT
Start: 2021-04-13 | End: 2021-04-16

## 2021-04-13 RX ORDER — SODIUM CHLORIDE 0.65 %
1 AEROSOL, SPRAY (ML) NASAL THREE TIMES A DAY
Refills: 0 | Status: DISCONTINUED | OUTPATIENT
Start: 2021-04-13 | End: 2021-04-16

## 2021-04-13 RX ORDER — ASPIRIN/CALCIUM CARB/MAGNESIUM 324 MG
81 TABLET ORAL DAILY
Refills: 0 | Status: DISCONTINUED | OUTPATIENT
Start: 2021-04-13 | End: 2021-04-16

## 2021-04-13 RX ADMIN — Medication 14 UNIT(S): at 10:08

## 2021-04-13 RX ADMIN — ENOXAPARIN SODIUM 40 MILLIGRAM(S): 100 INJECTION SUBCUTANEOUS at 14:01

## 2021-04-13 RX ADMIN — Medication 1: at 23:39

## 2021-04-13 RX ADMIN — ALBUTEROL 2 PUFF(S): 90 AEROSOL, METERED ORAL at 11:02

## 2021-04-13 RX ADMIN — Medication 10: at 14:00

## 2021-04-13 RX ADMIN — ALBUTEROL 2 PUFF(S): 90 AEROSOL, METERED ORAL at 18:14

## 2021-04-13 RX ADMIN — Medication 8: at 18:17

## 2021-04-13 RX ADMIN — INSULIN GLARGINE 30 UNIT(S): 100 INJECTION, SOLUTION SUBCUTANEOUS at 23:38

## 2021-04-13 RX ADMIN — Medication 20 UNIT(S): at 18:17

## 2021-04-13 RX ADMIN — Medication 1 SPRAY(S): at 23:41

## 2021-04-13 RX ADMIN — Medication 4: at 10:08

## 2021-04-13 RX ADMIN — Medication 1 SPRAY(S): at 14:48

## 2021-04-13 RX ADMIN — Medication 81 MILLIGRAM(S): at 23:25

## 2021-04-13 RX ADMIN — Medication 6 MILLIGRAM(S): at 05:22

## 2021-04-13 RX ADMIN — ATORVASTATIN CALCIUM 10 MILLIGRAM(S): 80 TABLET, FILM COATED ORAL at 23:18

## 2021-04-13 RX ADMIN — Medication 20 UNIT(S): at 13:59

## 2021-04-13 NOTE — PROGRESS NOTE ADULT - SUBJECTIVE AND OBJECTIVE BOX
Dr. Taylor Graves  Pager [phone]    PROGRESS NOTE:     Patient is a 55y old  Male who presents with a chief complaint of RUQ pain (13 Apr 2021 12:39)      SUBJECTIVE / OVERNIGHT EVENTS: pt denies chest pain ., breathing nonlabored  ADDITIONAL REVIEW OF SYSTEMS: afebrile, on NC, satting high 80's    MEDICATIONS  (STANDING):  dexAMETHasone  Injectable 6 milliGRAM(s) IV Push daily  dextrose 40% Gel 15 Gram(s) Oral once  dextrose 5%. 1000 milliLiter(s) (50 mL/Hr) IV Continuous <Continuous>  dextrose 5%. 1000 milliLiter(s) (100 mL/Hr) IV Continuous <Continuous>  dextrose 50% Injectable 25 Gram(s) IV Push once  dextrose 50% Injectable 12.5 Gram(s) IV Push once  dextrose 50% Injectable 25 Gram(s) IV Push once  enoxaparin Injectable 40 milliGRAM(s) SubCutaneous daily  glucagon  Injectable 1 milliGRAM(s) IntraMuscular once  insulin glargine Injectable (LANTUS) 30 Unit(s) SubCutaneous at bedtime  insulin lispro (ADMELOG) corrective regimen sliding scale   SubCutaneous three times a day before meals  insulin lispro (ADMELOG) corrective regimen sliding scale   SubCutaneous at bedtime  insulin lispro Injectable (ADMELOG) 20 Unit(s) SubCutaneous three times a day before meals  sodium chloride 0.65% Nasal 1 Spray(s) Both Nostrils three times a day    MEDICATIONS  (PRN):  acetaminophen   Tablet .. 650 milliGRAM(s) Oral every 4 hours PRN Temp greater or equal to 38C (100.4F), Mild Pain (1 - 3), Moderate Pain (4 - 6)  ALBUTerol    90 MICROgram(s) HFA Inhaler 2 Puff(s) Inhalation every 4 hours PRN Shortness of Breath and/or Wheezing      CAPILLARY BLOOD GLUCOSE      POCT Blood Glucose.: 395 mg/dL (13 Apr 2021 13:12)  POCT Blood Glucose.: 245 mg/dL (13 Apr 2021 09:31)  POCT Blood Glucose.: 275 mg/dL (13 Apr 2021 01:08)  POCT Blood Glucose.: 419 mg/dL (12 Apr 2021 21:33)  POCT Blood Glucose.: 459 mg/dL (12 Apr 2021 21:32)  POCT Blood Glucose.: 318 mg/dL (12 Apr 2021 18:00)    I&O's Summary    12 Apr 2021 07:01  -  13 Apr 2021 07:00  --------------------------------------------------------  IN: 0 mL / OUT: 600 mL / NET: -600 mL        PHYSICAL EXAM:  Vital Signs Last 24 Hrs  T(C): 36.6 (13 Apr 2021 05:20), Max: 37 (12 Apr 2021 20:24)  T(F): 97.9 (13 Apr 2021 05:20), Max: 98.6 (12 Apr 2021 20:24)  HR: 75 (13 Apr 2021 11:11) (49 - 75)  BP: 112/70 (13 Apr 2021 05:20) (108/62 - 112/70)  BP(mean): --  RR: 18 (13 Apr 2021 11:11) (16 - 18)  SpO2: 94% (13 Apr 2021 11:11) (94% - 94%)  CONSTITUTIONAL: NAD, well-developed  RESPIRATORY: Normal respiratory effort; lungs are clear to auscultation bilaterally  CARDIOVASCULAR: Regular rate and rhythm, normal S1 and S2, no murmur/rub/gallop; No lower extremity edema; Peripheral pulses are 2+ bilaterally  ABDOMEN: Nontender to palpation, normoactive bowel sounds, no rebound/guarding; No hepatosplenomegaly  MUSCULOSKELETAL: no clubbing or cyanosis of digits; no joint swelling or tenderness to palpation  PSYCH: A+O to person, place, and time; affect appropriate    LABS:                        14.7   10.88 )-----------( 317      ( 13 Apr 2021 07:01 )             43.5     04-13    140  |  104  |  25<H>  ----------------------------<  202<H>  4.1   |  23  |  0.60    Ca    9.3      13 Apr 2021 07:01  Phos  3.0     04-13  Mg     2.4     04-13    TPro  7.3  /  Alb  3.5  /  TBili  0.4  /  DBili  x   /  AST  17  /  ALT  24  /  AlkPhos  69  04-13    RADIOLOGY & ADDITIONAL TESTS:  Results Reviewed:   Imaging Personally Reviewed:  Electrocardiogram Personally Reviewed:    COORDINATION OF CARE:  Care Discussed with Consultants/Other Providers [Y/N]: anastacio Corral, titrate o2, f/u endo recs  Prior or Outpatient Records Reviewed [Y/N]:

## 2021-04-13 NOTE — PROGRESS NOTE ADULT - PROBLEM SELECTOR PLAN 3
outpt f/u  nl LFT  fatty liver on sono  check lipid profile in AM outpt f/u  nl LFT  fatty liver on sono  Lipid panel reviewed (ldl 91, chol 145), start low dose statin

## 2021-04-13 NOTE — PROGRESS NOTE ADULT - PROBLEM SELECTOR PLAN 4
- LDL 91  - should be treated with statin such as Lipitor 20 mg qhs as long as there is no contraindication.

## 2021-04-13 NOTE — PROGRESS NOTE ADULT - SUBJECTIVE AND OBJECTIVE BOX
Chief Complaint: f/u DM2    History:  Patient seen at bedside this morning with his nurse and hospitalist present. Had hypoxia to the 80's and was placed on NRB. He denied SOB. No nausea, vomiting, abdominal pain. Eating well. BG remains high.    MEDICATIONS  (STANDING):  dexAMETHasone  Injectable 6 milliGRAM(s) IV Push daily  dextrose 40% Gel 15 Gram(s) Oral once  dextrose 5%. 1000 milliLiter(s) (50 mL/Hr) IV Continuous <Continuous>  dextrose 5%. 1000 milliLiter(s) (100 mL/Hr) IV Continuous <Continuous>  dextrose 50% Injectable 25 Gram(s) IV Push once  dextrose 50% Injectable 12.5 Gram(s) IV Push once  dextrose 50% Injectable 25 Gram(s) IV Push once  enoxaparin Injectable 40 milliGRAM(s) SubCutaneous daily  glucagon  Injectable 1 milliGRAM(s) IntraMuscular once  insulin glargine Injectable (LANTUS) 24 Unit(s) SubCutaneous at bedtime  insulin lispro (ADMELOG) corrective regimen sliding scale   SubCutaneous three times a day before meals  insulin lispro (ADMELOG) corrective regimen sliding scale   SubCutaneous at bedtime  insulin lispro Injectable (ADMELOG) 14 Unit(s) SubCutaneous before breakfast  insulin lispro Injectable (ADMELOG) 14 Unit(s) SubCutaneous before lunch  insulin lispro Injectable (ADMELOG) 14 Unit(s) SubCutaneous before dinner  sodium chloride 0.65% Nasal 1 Spray(s) Both Nostrils three times a day    MEDICATIONS  (PRN):  acetaminophen   Tablet .. 650 milliGRAM(s) Oral every 4 hours PRN Temp greater or equal to 38C (100.4F), Mild Pain (1 - 3), Moderate Pain (4 - 6)  ALBUTerol    90 MICROgram(s) HFA Inhaler 2 Puff(s) Inhalation every 4 hours PRN Shortness of Breath and/or Wheezing      Allergies  No Known Allergies    Review of Systems:  ALL OTHER SYSTEMS REVIEWED AND NEGATIVE    PHYSICAL EXAM:  VITALS: T(C): 36.6 (04-13-21 @ 05:20)  T(F): 97.9 (04-13-21 @ 05:20), Max: 98.6 (04-12-21 @ 20:24)  HR: 75 (04-13-21 @ 11:11) (49 - 75)  BP: 112/70 (04-13-21 @ 05:20) (108/62 - 112/70)  RR:  (16 - 18)  SpO2:  (94% - 94%)  Wt(kg): --  GENERAL: NAD, well-developed  EYES: No proptosis, anicteric  HEENT:  Atraumatic, Normocephalic  RESPIRATORY: + air movement bilaterally, no respiratory distress   GI: Soft, nontender, non distended  PSYCH: Alert and oriented x 3, reactive affect, euthymic mood     POCT Blood Glucose.: 245 mg/dL (04-13-21 @ 09:31) A 14, A 4  POCT Blood Glucose.: 275 mg/dL (04-13-21 @ 01:08)  POCT Blood Glucose.: 419 mg/dL (04-12-21 @ 21:33) L 24, A 4   POCT Blood Glucose.: 459 mg/dL (04-12-21 @ 21:32)  POCT Blood Glucose.: 318 mg/dL (04-12-21 @ 18:00) A 14, A 8  POCT Blood Glucose.: 372 mg/dL (04-12-21 @ 13:18)   POCT Blood Glucose.: 416 mg/dL (04-12-21 @ 13:18) A 10, A 12  POCT Blood Glucose.: 338 mg/dL (04-12-21 @ 09:11) A 5, A 8   POCT Blood Glucose.: 319 mg/dL (04-11-21 @ 21:18)  POCT Blood Glucose.: 336 mg/dL (04-11-21 @ 17:57)  POCT Blood Glucose.: 360 mg/dL (04-11-21 @ 16:04)  POCT Blood Glucose.: 410 mg/dL (04-11-21 @ 14:52)  POCT Blood Glucose.: 482 mg/dL (04-11-21 @ 14:50)  POCT Blood Glucose.: 386 mg/dL (04-11-21 @ 13:30)  POCT Blood Glucose.: 347 mg/dL (04-11-21 @ 09:32)  POCT Blood Glucose.: 399 mg/dL (04-10-21 @ 21:26)  POCT Blood Glucose.: 325 mg/dL (04-10-21 @ 18:00)  POCT Blood Glucose.: 295 mg/dL (04-10-21 @ 13:04)      04-13    140  |  104  |  25<H>  ----------------------------<  202<H>  4.1   |  23  |  0.60    EGFR if : 133  EGFR if non : 115    Ca    9.3      04-13  Mg     2.4     04-13  Phos  3.0     04-13    TPro  7.3  /  Alb  3.5  /  TBili  0.4  /  DBili  x   /  AST  17  /  ALT  24  /  AlkPhos  69  04-13

## 2021-04-13 NOTE — CHART NOTE - NSCHARTNOTEFT_GEN_A_CORE
RN notified that patient's FS- 459 and repeat was 419,.patient is asymptomatic. VSS otherwise. Advised to give the slising scale dose of admelog 4 units and lantus 24 units and recheck around 1 am. Recheck at 1 am noted as 275. Will continue to monitor

## 2021-04-13 NOTE — PROGRESS NOTE ADULT - PROBLEM SELECTOR PLAN 1
- on dexamethasone for COVID with BG up to the 400's  - increase Lantus to 30 units qhs  - increase Admelog to 20 units before meals  - moderate correction scale qac and qhs  - consistent carb diet  - check FS qac and qhs  - will follow  - notify endocrine service of any changes to steroid plan.

## 2021-04-13 NOTE — PROGRESS NOTE ADULT - PROBLEM SELECTOR PLAN 4
Uncontrolled,   Apprec endo recs, increased lantus to 30u hs and admelog 20u tidac, on discharge transition on home regimen of prandin 1 mg before meals  hyperglycemia likely d/t steroids  A1c 7.1  start lipitor 10mg hs and ASA 81 mg for cardioprotection  monitor FS closely  pt in steroid study

## 2021-04-14 LAB
ALBUMIN SERPL ELPH-MCNC: 3.2 G/DL — LOW (ref 3.3–5)
ALP SERPL-CCNC: 62 U/L — SIGNIFICANT CHANGE UP (ref 40–120)
ALT FLD-CCNC: 22 U/L — SIGNIFICANT CHANGE UP (ref 4–41)
ANION GAP SERPL CALC-SCNC: 7 MMOL/L — SIGNIFICANT CHANGE UP (ref 7–14)
AST SERPL-CCNC: 22 U/L — SIGNIFICANT CHANGE UP (ref 4–40)
BASOPHILS # BLD AUTO: 0.01 K/UL — SIGNIFICANT CHANGE UP (ref 0–0.2)
BASOPHILS NFR BLD AUTO: 0.1 % — SIGNIFICANT CHANGE UP (ref 0–2)
BILIRUB SERPL-MCNC: 0.4 MG/DL — SIGNIFICANT CHANGE UP (ref 0.2–1.2)
BUN SERPL-MCNC: 24 MG/DL — HIGH (ref 7–23)
CALCIUM SERPL-MCNC: 8.7 MG/DL — SIGNIFICANT CHANGE UP (ref 8.4–10.5)
CHLORIDE SERPL-SCNC: 106 MMOL/L — SIGNIFICANT CHANGE UP (ref 98–107)
CO2 SERPL-SCNC: 25 MMOL/L — SIGNIFICANT CHANGE UP (ref 22–31)
CREAT SERPL-MCNC: 0.62 MG/DL — SIGNIFICANT CHANGE UP (ref 0.5–1.3)
CRP SERPL-MCNC: 6.8 MG/L — HIGH
D DIMER BLD IA.RAPID-MCNC: <150 NG/ML DDU — SIGNIFICANT CHANGE UP
EOSINOPHIL # BLD AUTO: 0.09 K/UL — SIGNIFICANT CHANGE UP (ref 0–0.5)
EOSINOPHIL NFR BLD AUTO: 0.8 % — SIGNIFICANT CHANGE UP (ref 0–6)
FERRITIN SERPL-MCNC: 361 NG/ML — SIGNIFICANT CHANGE UP (ref 30–400)
GLUCOSE SERPL-MCNC: 100 MG/DL — HIGH (ref 70–99)
HCT VFR BLD CALC: 43.2 % — SIGNIFICANT CHANGE UP (ref 39–50)
HGB BLD-MCNC: 14.3 G/DL — SIGNIFICANT CHANGE UP (ref 13–17)
IANC: 8.64 K/UL — HIGH (ref 1.5–8.5)
IMM GRANULOCYTES NFR BLD AUTO: 0.6 % — SIGNIFICANT CHANGE UP (ref 0–1.5)
LDH SERPL L TO P-CCNC: 225 U/L — SIGNIFICANT CHANGE UP (ref 135–225)
LYMPHOCYTES # BLD AUTO: 1.93 K/UL — SIGNIFICANT CHANGE UP (ref 1–3.3)
LYMPHOCYTES # BLD AUTO: 17 % — SIGNIFICANT CHANGE UP (ref 13–44)
MAGNESIUM SERPL-MCNC: 2.4 MG/DL — SIGNIFICANT CHANGE UP (ref 1.6–2.6)
MCHC RBC-ENTMCNC: 27.4 PG — SIGNIFICANT CHANGE UP (ref 27–34)
MCHC RBC-ENTMCNC: 33.1 GM/DL — SIGNIFICANT CHANGE UP (ref 32–36)
MCV RBC AUTO: 82.9 FL — SIGNIFICANT CHANGE UP (ref 80–100)
MONOCYTES # BLD AUTO: 0.59 K/UL — SIGNIFICANT CHANGE UP (ref 0–0.9)
MONOCYTES NFR BLD AUTO: 5.2 % — SIGNIFICANT CHANGE UP (ref 2–14)
NEUTROPHILS # BLD AUTO: 8.64 K/UL — HIGH (ref 1.8–7.4)
NEUTROPHILS NFR BLD AUTO: 76.3 % — SIGNIFICANT CHANGE UP (ref 43–77)
NRBC # BLD: 0 /100 WBCS — SIGNIFICANT CHANGE UP
NRBC # FLD: 0 K/UL — SIGNIFICANT CHANGE UP
PHOSPHATE SERPL-MCNC: 3.1 MG/DL — SIGNIFICANT CHANGE UP (ref 2.5–4.5)
PLATELET # BLD AUTO: 305 K/UL — SIGNIFICANT CHANGE UP (ref 150–400)
POTASSIUM SERPL-MCNC: 3.9 MMOL/L — SIGNIFICANT CHANGE UP (ref 3.5–5.3)
POTASSIUM SERPL-SCNC: 3.9 MMOL/L — SIGNIFICANT CHANGE UP (ref 3.5–5.3)
PROCALCITONIN SERPL-MCNC: 0.05 NG/ML — SIGNIFICANT CHANGE UP (ref 0.02–0.1)
PROT SERPL-MCNC: 7 G/DL — SIGNIFICANT CHANGE UP (ref 6–8.3)
RBC # BLD: 5.21 M/UL — SIGNIFICANT CHANGE UP (ref 4.2–5.8)
RBC # FLD: 12.4 % — SIGNIFICANT CHANGE UP (ref 10.3–14.5)
SODIUM SERPL-SCNC: 138 MMOL/L — SIGNIFICANT CHANGE UP (ref 135–145)
WBC # BLD: 11.33 K/UL — HIGH (ref 3.8–10.5)
WBC # FLD AUTO: 11.33 K/UL — HIGH (ref 3.8–10.5)

## 2021-04-14 PROCEDURE — 99232 SBSQ HOSP IP/OBS MODERATE 35: CPT

## 2021-04-14 PROCEDURE — 99233 SBSQ HOSP IP/OBS HIGH 50: CPT

## 2021-04-14 RX ORDER — INSULIN LISPRO 100/ML
10 VIAL (ML) SUBCUTANEOUS ONCE
Refills: 0 | Status: COMPLETED | OUTPATIENT
Start: 2021-04-14 | End: 2021-04-14

## 2021-04-14 RX ORDER — INSULIN LISPRO 100/ML
24 VIAL (ML) SUBCUTANEOUS
Refills: 0 | Status: DISCONTINUED | OUTPATIENT
Start: 2021-04-14 | End: 2021-04-15

## 2021-04-14 RX ORDER — INSULIN LISPRO 100/ML
24 VIAL (ML) SUBCUTANEOUS
Refills: 0 | Status: DISCONTINUED | OUTPATIENT
Start: 2021-04-15 | End: 2021-04-15

## 2021-04-14 RX ORDER — INSULIN GLARGINE 100 [IU]/ML
28 INJECTION, SOLUTION SUBCUTANEOUS AT BEDTIME
Refills: 0 | Status: DISCONTINUED | OUTPATIENT
Start: 2021-04-14 | End: 2021-04-16

## 2021-04-14 RX ADMIN — Medication 10 UNIT(S): at 09:54

## 2021-04-14 RX ADMIN — Medication 24 UNIT(S): at 18:35

## 2021-04-14 RX ADMIN — Medication 1 SPRAY(S): at 21:32

## 2021-04-14 RX ADMIN — Medication 81 MILLIGRAM(S): at 13:22

## 2021-04-14 RX ADMIN — Medication 20 UNIT(S): at 13:43

## 2021-04-14 RX ADMIN — ENOXAPARIN SODIUM 40 MILLIGRAM(S): 100 INJECTION SUBCUTANEOUS at 13:23

## 2021-04-14 RX ADMIN — INSULIN GLARGINE 28 UNIT(S): 100 INJECTION, SOLUTION SUBCUTANEOUS at 21:31

## 2021-04-14 RX ADMIN — Medication 8: at 13:44

## 2021-04-14 RX ADMIN — Medication 10: at 18:36

## 2021-04-14 RX ADMIN — Medication 3: at 21:32

## 2021-04-14 RX ADMIN — ATORVASTATIN CALCIUM 10 MILLIGRAM(S): 80 TABLET, FILM COATED ORAL at 21:10

## 2021-04-14 RX ADMIN — Medication 1 SPRAY(S): at 06:53

## 2021-04-14 RX ADMIN — Medication 6 MILLIGRAM(S): at 07:23

## 2021-04-14 NOTE — PROGRESS NOTE ADULT - PROBLEM SELECTOR PLAN 1
- on dexamethasone for COVID with BG up to the 400's  - adjust Lantus to 28 units qhs  - increase Admelog to 24 units before meals - if BG is 100 or less, can reduce dose by 20%, rather than hold the dose, to prevent rebound hyperglycemia if patient is eating  - moderate correction scale qac and qhs  - consistent carb diet  - check FS qac and qhs  - will follow  - notify endocrine service of any changes to steroid plan. - on dexamethasone for COVID with BG up to the 400's  - adjust Lantus to 28 units qhs  - increase Admelog to 24 units before meals - if BG is 100 or less, can reduce dose by 20%, to prevent significant rebound hyperglycemia if patient is eating  - moderate correction scale qac and qhs  - consistent carb diet  - check FS qac and qhs  - will follow  - notify endocrine service of any changes to steroid plan.

## 2021-04-14 NOTE — PROGRESS NOTE ADULT - PROBLEM SELECTOR PLAN 4
Uncontrolled,   Apprec endo recs, increased lantus to 28u hs and admelog 24u tidac, on discharge transition on home regimen of prandin 1 mg before meals  hyperglycemia likely d/t steroids  A1c 7.1  start lipitor 10mg hs and ASA 81 mg for cardioprotection  monitor FS closely  pt in steroid study

## 2021-04-14 NOTE — PROGRESS NOTE ADULT - SUBJECTIVE AND OBJECTIVE BOX
Dr. Taylor Graves  Pager [phone]    PROGRESS NOTE:     Patient is a 55y old  Male who presents with a chief complaint of RUQ pain (14 Apr 2021 13:37)      SUBJECTIVE / OVERNIGHT EVENTS: pt denies chest pain, breathing  okay on 6L NC   ADDITIONAL REVIEW OF SYSTEMS: afebrile    MEDICATIONS  (STANDING):  aspirin  chewable 81 milliGRAM(s) Oral daily  atorvastatin 10 milliGRAM(s) Oral at bedtime  dexAMETHasone  Injectable 6 milliGRAM(s) IV Push daily  dextrose 40% Gel 15 Gram(s) Oral once  dextrose 5%. 1000 milliLiter(s) (50 mL/Hr) IV Continuous <Continuous>  dextrose 5%. 1000 milliLiter(s) (100 mL/Hr) IV Continuous <Continuous>  dextrose 50% Injectable 25 Gram(s) IV Push once  dextrose 50% Injectable 12.5 Gram(s) IV Push once  dextrose 50% Injectable 25 Gram(s) IV Push once  enoxaparin Injectable 40 milliGRAM(s) SubCutaneous daily  glucagon  Injectable 1 milliGRAM(s) IntraMuscular once  insulin glargine Injectable (LANTUS) 28 Unit(s) SubCutaneous at bedtime  insulin lispro (ADMELOG) corrective regimen sliding scale   SubCutaneous three times a day before meals  insulin lispro (ADMELOG) corrective regimen sliding scale   SubCutaneous at bedtime  insulin lispro Injectable (ADMELOG) 20 Unit(s) SubCutaneous three times a day before meals  sodium chloride 0.65% Nasal 1 Spray(s) Both Nostrils three times a day    MEDICATIONS  (PRN):  acetaminophen   Tablet .. 650 milliGRAM(s) Oral every 4 hours PRN Temp greater or equal to 38C (100.4F), Mild Pain (1 - 3), Moderate Pain (4 - 6)  ALBUTerol    90 MICROgram(s) HFA Inhaler 2 Puff(s) Inhalation every 4 hours PRN Shortness of Breath and/or Wheezing      CAPILLARY BLOOD GLUCOSE      POCT Blood Glucose.: 338 mg/dL (14 Apr 2021 13:41)  POCT Blood Glucose.: 318 mg/dL (14 Apr 2021 12:48)  POCT Blood Glucose.: 118 mg/dL (14 Apr 2021 09:52)  POCT Blood Glucose.: 99 mg/dL (14 Apr 2021 08:47)  POCT Blood Glucose.: 271 mg/dL (13 Apr 2021 23:27)  POCT Blood Glucose.: 346 mg/dL (13 Apr 2021 21:20)  POCT Blood Glucose.: 330 mg/dL (13 Apr 2021 17:34)    I&O's Summary    13 Apr 2021 07:01  -  14 Apr 2021 07:00  --------------------------------------------------------  IN: 900 mL / OUT: 0 mL / NET: 900 mL        PHYSICAL EXAM:  Vital Signs Last 24 Hrs  T(C): 37 (14 Apr 2021 13:18), Max: 37 (14 Apr 2021 13:18)  T(F): 98.6 (14 Apr 2021 13:18), Max: 98.6 (14 Apr 2021 13:18)  HR: 58 (14 Apr 2021 13:18) (58 - 63)  BP: 105/58 (14 Apr 2021 13:18) (101/59 - 121/69)  BP(mean): --  RR: 18 (14 Apr 2021 13:18) (17 - 19)  SpO2: 95% (14 Apr 2021 13:18) (95% - 98%)  CONSTITUTIONAL: NAD, well-developed, NC  RESPIRATORY: Normal respiratory effort; lungs are clear to auscultation bilaterally  CARDIOVASCULAR: Regular rate and rhythm, normal S1 and S2, no murmur/rub/gallop; No lower extremity edema; Peripheral pulses are 2+ bilaterally  ABDOMEN: Nontender to palpation, normoactive bowel sounds, no rebound/guarding; No hepatosplenomegaly  MUSCULOSKELETAL: no clubbing or cyanosis of digits; no joint swelling or tenderness to palpation  PSYCH: A+O to person, place, and time; affect appropriate      LABS:                        14.3   11.33 )-----------( 305      ( 14 Apr 2021 07:18 )             43.2     04-14    138  |  106  |  24<H>  ----------------------------<  100<H>  3.9   |  25  |  0.62    Ca    8.7      14 Apr 2021 07:18  Phos  3.1     04-14  Mg     2.4     04-14    TPro  7.0  /  Alb  3.2<L>  /  TBili  0.4  /  DBili  x   /  AST  22  /  ALT  22  /  AlkPhos  62  04-14          RADIOLOGY & ADDITIONAL TESTS:  Results Reviewed:   Imaging Personally Reviewed:  Electrocardiogram Personally Reviewed:    COORDINATION OF CARE:  Care Discussed with Consultants/Other Providers [Y/N]: STEVE Corral, insulin regimen per endo, titrate o2  Prior or Outpatient Records Reviewed [Y/N]:

## 2021-04-14 NOTE — PROGRESS NOTE ADULT - PROBLEM SELECTOR PLAN 3
outpt f/u  nl LFT  fatty liver on sono  Lipid panel reviewed (ldl 91, chol 145), started low dose statin

## 2021-04-14 NOTE — PROVIDER CONTACT NOTE (OTHER) - ASSESSMENT
Pt is resting comfortably in bed, denies the presence of hyperglycemic signs & symptoms Pt is resting comfortably in bed, denies the presence of hyperglycemic signs & symptoms at this time.

## 2021-04-14 NOTE — PROGRESS NOTE ADULT - SUBJECTIVE AND OBJECTIVE BOX
Chief Complaint: f/u DM2    History:  Patient seen at bedside this AM. Remains on oxygen, but denied SOB. Does not have nausea, vomiting, abdominal pain. He ate his breakfast this morning but didn't receive Admelog, with resulting hyperglycemia to the 300's at lunch today.     MEDICATIONS  (STANDING):  aspirin  chewable 81 milliGRAM(s) Oral daily  atorvastatin 10 milliGRAM(s) Oral at bedtime  dexAMETHasone  Injectable 6 milliGRAM(s) IV Push daily  dextrose 40% Gel 15 Gram(s) Oral once  dextrose 5%. 1000 milliLiter(s) (50 mL/Hr) IV Continuous <Continuous>  dextrose 5%. 1000 milliLiter(s) (100 mL/Hr) IV Continuous <Continuous>  dextrose 50% Injectable 25 Gram(s) IV Push once  dextrose 50% Injectable 12.5 Gram(s) IV Push once  dextrose 50% Injectable 25 Gram(s) IV Push once  enoxaparin Injectable 40 milliGRAM(s) SubCutaneous daily  glucagon  Injectable 1 milliGRAM(s) IntraMuscular once  insulin glargine Injectable (LANTUS) 30 Unit(s) SubCutaneous at bedtime  insulin lispro (ADMELOG) corrective regimen sliding scale   SubCutaneous three times a day before meals  insulin lispro (ADMELOG) corrective regimen sliding scale   SubCutaneous at bedtime  insulin lispro Injectable (ADMELOG) 20 Unit(s) SubCutaneous three times a day before meals  sodium chloride 0.65% Nasal 1 Spray(s) Both Nostrils three times a day    MEDICATIONS  (PRN):  acetaminophen   Tablet .. 650 milliGRAM(s) Oral every 4 hours PRN Temp greater or equal to 38C (100.4F), Mild Pain (1 - 3), Moderate Pain (4 - 6)  ALBUTerol    90 MICROgram(s) HFA Inhaler 2 Puff(s) Inhalation every 4 hours PRN Shortness of Breath and/or Wheezing      Allergies  No Known Allergies    Review of Systems:  ALL OTHER SYSTEMS REVIEWED AND NEGATIVE    PHYSICAL EXAM:  VITALS: T(C): 37 (04-14-21 @ 13:18)  T(F): 98.6 (04-14-21 @ 13:18), Max: 98.6 (04-14-21 @ 13:18)  HR: 58 (04-14-21 @ 13:18) (58 - 63)  BP: 105/58 (04-14-21 @ 13:18) (101/59 - 121/69)  RR:  (17 - 19)  SpO2:  (95% - 98%)  Wt(kg): --  GENERAL: NAD, well-developed  EYES: No proptosis, anicteric  HEENT:  Atraumatic, Normocephalic  RESPIRATORY: + air movement bilaterally, no respiratory distress   PSYCH: Alert and oriented x 3, reactive affect, euthymic mood     POCT Blood Glucose.: 318 mg/dL (04-14-21 @ 12:48)  POCT Blood Glucose.: 118 mg/dL (04-14-21 @ 09:52) no Admelog   POCT Blood Glucose.: 99 mg/dL (04-14-21 @ 08:47)  POCT Blood Glucose.: 271 mg/dL (04-13-21 @ 23:27) L 30, A 1   POCT Blood Glucose.: 346 mg/dL (04-13-21 @ 21:20)  POCT Blood Glucose.: 330 mg/dL (04-13-21 @ 17:34) A 20, A 8  POCT Blood Glucose.: 395 mg/dL (04-13-21 @ 13:12) A 20, A 10   POCT Blood Glucose.: 245 mg/dL (04-13-21 @ 09:31) A 14, A 4  POCT Blood Glucose.: 275 mg/dL (04-13-21 @ 01:08)  POCT Blood Glucose.: 419 mg/dL (04-12-21 @ 21:33)  POCT Blood Glucose.: 459 mg/dL (04-12-21 @ 21:32)  POCT Blood Glucose.: 318 mg/dL (04-12-21 @ 18:00)  POCT Blood Glucose.: 372 mg/dL (04-12-21 @ 13:18)  POCT Blood Glucose.: 416 mg/dL (04-12-21 @ 13:18)  POCT Blood Glucose.: 338 mg/dL (04-12-21 @ 09:11)  POCT Blood Glucose.: 319 mg/dL (04-11-21 @ 21:18)  POCT Blood Glucose.: 336 mg/dL (04-11-21 @ 17:57)  POCT Blood Glucose.: 360 mg/dL (04-11-21 @ 16:04)  POCT Blood Glucose.: 410 mg/dL (04-11-21 @ 14:52)  POCT Blood Glucose.: 482 mg/dL (04-11-21 @ 14:50)      04-14    138  |  106  |  24<H>  ----------------------------<  100<H>  3.9   |  25  |  0.62    EGFR if : 131  EGFR if non : 113    Ca    8.7      04-14  Mg     2.4     04-14  Phos  3.1     04-14    TPro  7.0  /  Alb  3.2<L>  /  TBili  0.4  /  DBili  x   /  AST  22  /  ALT  22  /  AlkPhos  62  04-14                               Chief Complaint: f/u DM2    History:  Patient seen at bedside this AM. Remains on oxygen, but denied SOB. Does not have nausea, vomiting, abdominal pain. He ate his breakfast this morning but only received Admelog 10 units, with resulting hyperglycemia to the 300's at lunch today.     MEDICATIONS  (STANDING):  aspirin  chewable 81 milliGRAM(s) Oral daily  atorvastatin 10 milliGRAM(s) Oral at bedtime  dexAMETHasone  Injectable 6 milliGRAM(s) IV Push daily  dextrose 40% Gel 15 Gram(s) Oral once  dextrose 5%. 1000 milliLiter(s) (50 mL/Hr) IV Continuous <Continuous>  dextrose 5%. 1000 milliLiter(s) (100 mL/Hr) IV Continuous <Continuous>  dextrose 50% Injectable 25 Gram(s) IV Push once  dextrose 50% Injectable 12.5 Gram(s) IV Push once  dextrose 50% Injectable 25 Gram(s) IV Push once  enoxaparin Injectable 40 milliGRAM(s) SubCutaneous daily  glucagon  Injectable 1 milliGRAM(s) IntraMuscular once  insulin glargine Injectable (LANTUS) 30 Unit(s) SubCutaneous at bedtime  insulin lispro (ADMELOG) corrective regimen sliding scale   SubCutaneous three times a day before meals  insulin lispro (ADMELOG) corrective regimen sliding scale   SubCutaneous at bedtime  insulin lispro Injectable (ADMELOG) 20 Unit(s) SubCutaneous three times a day before meals  sodium chloride 0.65% Nasal 1 Spray(s) Both Nostrils three times a day    MEDICATIONS  (PRN):  acetaminophen   Tablet .. 650 milliGRAM(s) Oral every 4 hours PRN Temp greater or equal to 38C (100.4F), Mild Pain (1 - 3), Moderate Pain (4 - 6)  ALBUTerol    90 MICROgram(s) HFA Inhaler 2 Puff(s) Inhalation every 4 hours PRN Shortness of Breath and/or Wheezing      Allergies  No Known Allergies    Review of Systems:  ALL OTHER SYSTEMS REVIEWED AND NEGATIVE    PHYSICAL EXAM:  VITALS: T(C): 37 (04-14-21 @ 13:18)  T(F): 98.6 (04-14-21 @ 13:18), Max: 98.6 (04-14-21 @ 13:18)  HR: 58 (04-14-21 @ 13:18) (58 - 63)  BP: 105/58 (04-14-21 @ 13:18) (101/59 - 121/69)  RR:  (17 - 19)  SpO2:  (95% - 98%)  Wt(kg): --  GENERAL: NAD, well-developed  EYES: No proptosis, anicteric  HEENT:  Atraumatic, Normocephalic  RESPIRATORY: + air movement bilaterally, no respiratory distress   PSYCH: Alert and oriented x 3, reactive affect, euthymic mood     POCT Blood Glucose.: 318 mg/dL (04-14-21 @ 12:48)  POCT Blood Glucose.: 118 mg/dL (04-14-21 @ 09:52) no Admelog   POCT Blood Glucose.: 99 mg/dL (04-14-21 @ 08:47)  POCT Blood Glucose.: 271 mg/dL (04-13-21 @ 23:27) L 30, A 1   POCT Blood Glucose.: 346 mg/dL (04-13-21 @ 21:20)  POCT Blood Glucose.: 330 mg/dL (04-13-21 @ 17:34) A 20, A 8  POCT Blood Glucose.: 395 mg/dL (04-13-21 @ 13:12) A 20, A 10   POCT Blood Glucose.: 245 mg/dL (04-13-21 @ 09:31) A 14, A 4  POCT Blood Glucose.: 275 mg/dL (04-13-21 @ 01:08)  POCT Blood Glucose.: 419 mg/dL (04-12-21 @ 21:33)  POCT Blood Glucose.: 459 mg/dL (04-12-21 @ 21:32)  POCT Blood Glucose.: 318 mg/dL (04-12-21 @ 18:00)  POCT Blood Glucose.: 372 mg/dL (04-12-21 @ 13:18)  POCT Blood Glucose.: 416 mg/dL (04-12-21 @ 13:18)  POCT Blood Glucose.: 338 mg/dL (04-12-21 @ 09:11)  POCT Blood Glucose.: 319 mg/dL (04-11-21 @ 21:18)  POCT Blood Glucose.: 336 mg/dL (04-11-21 @ 17:57)  POCT Blood Glucose.: 360 mg/dL (04-11-21 @ 16:04)  POCT Blood Glucose.: 410 mg/dL (04-11-21 @ 14:52)  POCT Blood Glucose.: 482 mg/dL (04-11-21 @ 14:50)      04-14    138  |  106  |  24<H>  ----------------------------<  100<H>  3.9   |  25  |  0.62    EGFR if : 131  EGFR if non : 113    Ca    8.7      04-14  Mg     2.4     04-14  Phos  3.1     04-14    TPro  7.0  /  Alb  3.2<L>  /  TBili  0.4  /  DBili  x   /  AST  22  /  ALT  22  /  AlkPhos  62  04-14

## 2021-04-14 NOTE — PROVIDER CONTACT NOTE (OTHER) - BACKGROUND
s/p DM2, covid positive, 5L NC, 
the patient is admitted for Covid 19 since 04/08/2021
s/p DM2 with sugars in the 300s due to decadron
Pt admitted 4/8 worsening RUQ pain.

## 2021-04-15 LAB
ALBUMIN SERPL ELPH-MCNC: 3 G/DL — LOW (ref 3.3–5)
ALP SERPL-CCNC: 59 U/L — SIGNIFICANT CHANGE UP (ref 40–120)
ALT FLD-CCNC: 26 U/L — SIGNIFICANT CHANGE UP (ref 4–41)
ANION GAP SERPL CALC-SCNC: 9 MMOL/L — SIGNIFICANT CHANGE UP (ref 7–14)
AST SERPL-CCNC: 19 U/L — SIGNIFICANT CHANGE UP (ref 4–40)
BASOPHILS # BLD AUTO: 0.02 K/UL — SIGNIFICANT CHANGE UP (ref 0–0.2)
BASOPHILS NFR BLD AUTO: 0.1 % — SIGNIFICANT CHANGE UP (ref 0–2)
BILIRUB SERPL-MCNC: 0.4 MG/DL — SIGNIFICANT CHANGE UP (ref 0.2–1.2)
BUN SERPL-MCNC: 22 MG/DL — SIGNIFICANT CHANGE UP (ref 7–23)
CALCIUM SERPL-MCNC: 8.5 MG/DL — SIGNIFICANT CHANGE UP (ref 8.4–10.5)
CHLORIDE SERPL-SCNC: 106 MMOL/L — SIGNIFICANT CHANGE UP (ref 98–107)
CO2 SERPL-SCNC: 23 MMOL/L — SIGNIFICANT CHANGE UP (ref 22–31)
CREAT SERPL-MCNC: 0.59 MG/DL — SIGNIFICANT CHANGE UP (ref 0.5–1.3)
EOSINOPHIL # BLD AUTO: 0.04 K/UL — SIGNIFICANT CHANGE UP (ref 0–0.5)
EOSINOPHIL NFR BLD AUTO: 0.3 % — SIGNIFICANT CHANGE UP (ref 0–6)
GLUCOSE SERPL-MCNC: 105 MG/DL — HIGH (ref 70–99)
HCT VFR BLD CALC: 41.1 % — SIGNIFICANT CHANGE UP (ref 39–50)
HGB BLD-MCNC: 14.1 G/DL — SIGNIFICANT CHANGE UP (ref 13–17)
IANC: 11.89 K/UL — HIGH (ref 1.5–8.5)
IMM GRANULOCYTES NFR BLD AUTO: 0.9 % — SIGNIFICANT CHANGE UP (ref 0–1.5)
LYMPHOCYTES # BLD AUTO: 0.91 K/UL — LOW (ref 1–3.3)
LYMPHOCYTES # BLD AUTO: 6.7 % — LOW (ref 13–44)
MAGNESIUM SERPL-MCNC: 2.4 MG/DL — SIGNIFICANT CHANGE UP (ref 1.6–2.6)
MCHC RBC-ENTMCNC: 28.3 PG — SIGNIFICANT CHANGE UP (ref 27–34)
MCHC RBC-ENTMCNC: 34.3 GM/DL — SIGNIFICANT CHANGE UP (ref 32–36)
MCV RBC AUTO: 82.4 FL — SIGNIFICANT CHANGE UP (ref 80–100)
MONOCYTES # BLD AUTO: 0.56 K/UL — SIGNIFICANT CHANGE UP (ref 0–0.9)
MONOCYTES NFR BLD AUTO: 4.1 % — SIGNIFICANT CHANGE UP (ref 2–14)
NEUTROPHILS # BLD AUTO: 11.89 K/UL — HIGH (ref 1.8–7.4)
NEUTROPHILS NFR BLD AUTO: 87.9 % — HIGH (ref 43–77)
NRBC # BLD: 0 /100 WBCS — SIGNIFICANT CHANGE UP
NRBC # FLD: 0 K/UL — SIGNIFICANT CHANGE UP
PHOSPHATE SERPL-MCNC: 2.8 MG/DL — SIGNIFICANT CHANGE UP (ref 2.5–4.5)
PLATELET # BLD AUTO: 308 K/UL — SIGNIFICANT CHANGE UP (ref 150–400)
POTASSIUM SERPL-MCNC: 4 MMOL/L — SIGNIFICANT CHANGE UP (ref 3.5–5.3)
POTASSIUM SERPL-SCNC: 4 MMOL/L — SIGNIFICANT CHANGE UP (ref 3.5–5.3)
PROT SERPL-MCNC: 6.6 G/DL — SIGNIFICANT CHANGE UP (ref 6–8.3)
RBC # BLD: 4.99 M/UL — SIGNIFICANT CHANGE UP (ref 4.2–5.8)
RBC # FLD: 12.6 % — SIGNIFICANT CHANGE UP (ref 10.3–14.5)
SODIUM SERPL-SCNC: 138 MMOL/L — SIGNIFICANT CHANGE UP (ref 135–145)
WBC # BLD: 13.54 K/UL — HIGH (ref 3.8–10.5)
WBC # FLD AUTO: 13.54 K/UL — HIGH (ref 3.8–10.5)

## 2021-04-15 PROCEDURE — 99232 SBSQ HOSP IP/OBS MODERATE 35: CPT

## 2021-04-15 PROCEDURE — 99233 SBSQ HOSP IP/OBS HIGH 50: CPT

## 2021-04-15 RX ORDER — INSULIN LISPRO 100/ML
28 VIAL (ML) SUBCUTANEOUS
Refills: 0 | Status: DISCONTINUED | OUTPATIENT
Start: 2021-04-16 | End: 2021-04-16

## 2021-04-15 RX ORDER — INSULIN LISPRO 100/ML
28 VIAL (ML) SUBCUTANEOUS
Refills: 0 | Status: DISCONTINUED | OUTPATIENT
Start: 2021-04-15 | End: 2021-04-16

## 2021-04-15 RX ADMIN — Medication 24 UNIT(S): at 09:49

## 2021-04-15 RX ADMIN — Medication 650 MILLIGRAM(S): at 04:49

## 2021-04-15 RX ADMIN — Medication 28 UNIT(S): at 19:06

## 2021-04-15 RX ADMIN — ATORVASTATIN CALCIUM 10 MILLIGRAM(S): 80 TABLET, FILM COATED ORAL at 21:53

## 2021-04-15 RX ADMIN — Medication 8: at 19:05

## 2021-04-15 RX ADMIN — Medication 6: at 14:05

## 2021-04-15 RX ADMIN — Medication 1 SPRAY(S): at 21:53

## 2021-04-15 RX ADMIN — INSULIN GLARGINE 28 UNIT(S): 100 INJECTION, SOLUTION SUBCUTANEOUS at 21:49

## 2021-04-15 RX ADMIN — Medication 1 SPRAY(S): at 05:19

## 2021-04-15 RX ADMIN — ENOXAPARIN SODIUM 40 MILLIGRAM(S): 100 INJECTION SUBCUTANEOUS at 13:04

## 2021-04-15 RX ADMIN — Medication 81 MILLIGRAM(S): at 13:04

## 2021-04-15 RX ADMIN — Medication 24 UNIT(S): at 14:05

## 2021-04-15 RX ADMIN — Medication 650 MILLIGRAM(S): at 05:18

## 2021-04-15 RX ADMIN — Medication 6 MILLIGRAM(S): at 05:18

## 2021-04-15 RX ADMIN — Medication 2: at 21:50

## 2021-04-15 NOTE — PROGRESS NOTE ADULT - SUBJECTIVE AND OBJECTIVE BOX
Dr. Taylor Graves  Pager [phone]    PROGRESS NOTE:     Patient is a 55y old  Male who presents with a chief complaint of RUQ pain (14 Apr 2021 14:03)      SUBJECTIVE / OVERNIGHT EVENTS: pt denies chest pain or sob , breathing okay on 5L  ADDITIONAL REVIEW OF SYSTEMS: FS volatile, uncontrolled    MEDICATIONS  (STANDING):  aspirin  chewable 81 milliGRAM(s) Oral daily  atorvastatin 10 milliGRAM(s) Oral at bedtime  dexAMETHasone  Injectable 6 milliGRAM(s) IV Push daily  dextrose 40% Gel 15 Gram(s) Oral once  dextrose 5%. 1000 milliLiter(s) (50 mL/Hr) IV Continuous <Continuous>  dextrose 5%. 1000 milliLiter(s) (100 mL/Hr) IV Continuous <Continuous>  dextrose 50% Injectable 25 Gram(s) IV Push once  dextrose 50% Injectable 12.5 Gram(s) IV Push once  dextrose 50% Injectable 25 Gram(s) IV Push once  enoxaparin Injectable 40 milliGRAM(s) SubCutaneous daily  glucagon  Injectable 1 milliGRAM(s) IntraMuscular once  insulin glargine Injectable (LANTUS) 28 Unit(s) SubCutaneous at bedtime  insulin lispro (ADMELOG) corrective regimen sliding scale   SubCutaneous three times a day before meals  insulin lispro (ADMELOG) corrective regimen sliding scale   SubCutaneous at bedtime  insulin lispro Injectable (ADMELOG) 24 Unit(s) SubCutaneous before breakfast  insulin lispro Injectable (ADMELOG) 24 Unit(s) SubCutaneous before lunch  insulin lispro Injectable (ADMELOG) 24 Unit(s) SubCutaneous before dinner  sodium chloride 0.65% Nasal 1 Spray(s) Both Nostrils three times a day    MEDICATIONS  (PRN):  acetaminophen   Tablet .. 650 milliGRAM(s) Oral every 4 hours PRN Temp greater or equal to 38C (100.4F), Mild Pain (1 - 3), Moderate Pain (4 - 6)  ALBUTerol    90 MICROgram(s) HFA Inhaler 2 Puff(s) Inhalation every 4 hours PRN Shortness of Breath and/or Wheezing      CAPILLARY BLOOD GLUCOSE      POCT Blood Glucose.: 270 mg/dL (15 Apr 2021 13:20)  POCT Blood Glucose.: 126 mg/dL (15 Apr 2021 09:14)  POCT Blood Glucose.: 359 mg/dL (14 Apr 2021 21:17)  POCT Blood Glucose.: 409 mg/dL (14 Apr 2021 21:15)  POCT Blood Glucose.: 395 mg/dL (14 Apr 2021 17:44)    I&O's Summary    14 Apr 2021 07:01  -  15 Apr 2021 07:00  --------------------------------------------------------  IN: 0 mL / OUT: 300 mL / NET: -300 mL        PHYSICAL EXAM:  Vital Signs Last 24 Hrs  T(C): 36.8 (15 Apr 2021 11:03), Max: 37.2 (14 Apr 2021 22:45)  T(F): 98.3 (15 Apr 2021 11:03), Max: 98.9 (14 Apr 2021 22:45)  HR: 63 (15 Apr 2021 11:03) (51 - 63)  BP: 126/40 (15 Apr 2021 11:03) (103/57 - 126/40)  BP(mean): --  RR: 17 (15 Apr 2021 11:03) (17 - 18)  SpO2: 95% (15 Apr 2021 11:03) (92% - 96%)  CONSTITUTIONAL: NAD, well-developed, NC  RESPIRATORY: Normal respiratory effort; lungs are clear to auscultation bilaterally  CARDIOVASCULAR: Regular rate and rhythm, normal S1 and S2, no murmur/rub/gallop; No lower extremity edema; Peripheral pulses are 2+ bilaterally  ABDOMEN: Nontender to palpation, normoactive bowel sounds, no rebound/guarding; No hepatosplenomegaly  MUSCULOSKELETAL: no clubbing or cyanosis of digits; no joint swelling or tenderness to palpation  PSYCH: A+O to person, place, and time; affect appropriate    LABS:                        14.1   13.54 )-----------( 308      ( 15 Apr 2021 07:51 )             41.1     04-15    138  |  106  |  22  ----------------------------<  105<H>  4.0   |  23  |  0.59    Ca    8.5      15 Apr 2021 07:51  Phos  2.8     04-15  Mg     2.4     04-15    TPro  6.6  /  Alb  3.0<L>  /  TBili  0.4  /  DBili  x   /  AST  19  /  ALT  26  /  AlkPhos  59  04-15                RADIOLOGY & ADDITIONAL TESTS:  Results Reviewed:   Imaging Personally Reviewed:  Electrocardiogram Personally Reviewed:    COORDINATION OF CARE:  Care Discussed with Consultants/Other Providers [Y/N]: anastacio Gardiner f/jenn mcghee recs  Prior or Outpatient Records Reviewed [Y/N]:

## 2021-04-15 NOTE — PROGRESS NOTE ADULT - SUBJECTIVE AND OBJECTIVE BOX
Chief Complaint: f/u DM2    History:  Patient seen at bedside this morning, tolerating po, does not have nausea, vomiting, abdominal pain. Does not have SOB, remains on oxygen and remains on decadron.     MEDICATIONS  (STANDING):  aspirin  chewable 81 milliGRAM(s) Oral daily  atorvastatin 10 milliGRAM(s) Oral at bedtime  dexAMETHasone  Injectable 6 milliGRAM(s) IV Push daily  dextrose 40% Gel 15 Gram(s) Oral once  dextrose 5%. 1000 milliLiter(s) (50 mL/Hr) IV Continuous <Continuous>  dextrose 5%. 1000 milliLiter(s) (100 mL/Hr) IV Continuous <Continuous>  dextrose 50% Injectable 25 Gram(s) IV Push once  dextrose 50% Injectable 12.5 Gram(s) IV Push once  dextrose 50% Injectable 25 Gram(s) IV Push once  enoxaparin Injectable 40 milliGRAM(s) SubCutaneous daily  glucagon  Injectable 1 milliGRAM(s) IntraMuscular once  insulin glargine Injectable (LANTUS) 28 Unit(s) SubCutaneous at bedtime  insulin lispro (ADMELOG) corrective regimen sliding scale   SubCutaneous three times a day before meals  insulin lispro (ADMELOG) corrective regimen sliding scale   SubCutaneous at bedtime  insulin lispro Injectable (ADMELOG) 24 Unit(s) SubCutaneous before breakfast  insulin lispro Injectable (ADMELOG) 24 Unit(s) SubCutaneous before lunch  insulin lispro Injectable (ADMELOG) 24 Unit(s) SubCutaneous before dinner  sodium chloride 0.65% Nasal 1 Spray(s) Both Nostrils three times a day    MEDICATIONS  (PRN):  acetaminophen   Tablet .. 650 milliGRAM(s) Oral every 4 hours PRN Temp greater or equal to 38C (100.4F), Mild Pain (1 - 3), Moderate Pain (4 - 6)  ALBUTerol    90 MICROgram(s) HFA Inhaler 2 Puff(s) Inhalation every 4 hours PRN Shortness of Breath and/or Wheezing      Allergies  No Known Allergies    Review of Systems:  ALL OTHER SYSTEMS REVIEWED AND NEGATIVE    PHYSICAL EXAM:  VITALS: T(C): 36.8 (04-15-21 @ 11:03)  T(F): 98.3 (04-15-21 @ 11:03), Max: 98.9 (04-14-21 @ 22:45)  HR: 63 (04-15-21 @ 11:03) (51 - 63)  BP: 126/40 (04-15-21 @ 11:03) (103/57 - 126/40)  RR:  (17 - 18)  SpO2:  (92% - 96%)  Wt(kg): --  GENERAL: NAD, well-developed  EYES: No proptosis, anicteric  HEENT:  Atraumatic, Normocephalic  RESPIRATORY: + air movement bilaterally, no respiratory distress   PSYCH: Alert and oriented x 3, reactive affect, euthymic mood     POCT Blood Glucose.: 270 mg/dL (04-15-21 @ 13:20) A 24, A 6  POCT Blood Glucose.: 126 mg/dL (04-15-21 @ 09:14) A 24  POCT Blood Glucose.: 359 mg/dL (04-14-21 @ 21:17) L 28, A 3   POCT Blood Glucose.: 409 mg/dL (04-14-21 @ 21:15)  POCT Blood Glucose.: 395 mg/dL (04-14-21 @ 17:44) A 24, A 10  POCT Blood Glucose.: 338 mg/dL (04-14-21 @ 13:41) A 20   POCT Blood Glucose.: 318 mg/dL (04-14-21 @ 12:48)  POCT Blood Glucose.: 118 mg/dL (04-14-21 @ 09:52)  POCT Blood Glucose.: 99 mg/dL (04-14-21 @ 08:47)  POCT Blood Glucose.: 271 mg/dL (04-13-21 @ 23:27)  POCT Blood Glucose.: 346 mg/dL (04-13-21 @ 21:20)  POCT Blood Glucose.: 330 mg/dL (04-13-21 @ 17:34)  POCT Blood Glucose.: 395 mg/dL (04-13-21 @ 13:12)  POCT Blood Glucose.: 245 mg/dL (04-13-21 @ 09:31)  POCT Blood Glucose.: 275 mg/dL (04-13-21 @ 01:08)  POCT Blood Glucose.: 419 mg/dL (04-12-21 @ 21:33)  POCT Blood Glucose.: 459 mg/dL (04-12-21 @ 21:32)  POCT Blood Glucose.: 318 mg/dL (04-12-21 @ 18:00)      04-15    138  |  106  |  22  ----------------------------<  105<H>  4.0   |  23  |  0.59    EGFR if : 134  EGFR if non : 116    Ca    8.5      04-15  Mg     2.4     04-15  Phos  2.8     04-15    TPro  6.6  /  Alb  3.0<L>  /  TBili  0.4  /  DBili  x   /  AST  19  /  ALT  26  /  AlkPhos  59  04-15                               Chief Complaint: f/u DM2    History:  Patient seen at bedside this morning, tolerating po, does not have nausea, vomiting, abdominal pain. Does not have SOB, remains on oxygen and remains on decadron.     MEDICATIONS  (STANDING):  aspirin  chewable 81 milliGRAM(s) Oral daily  atorvastatin 10 milliGRAM(s) Oral at bedtime  dexAMETHasone  Injectable 6 milliGRAM(s) IV Push daily  dextrose 40% Gel 15 Gram(s) Oral once  dextrose 5%. 1000 milliLiter(s) (50 mL/Hr) IV Continuous <Continuous>  dextrose 5%. 1000 milliLiter(s) (100 mL/Hr) IV Continuous <Continuous>  dextrose 50% Injectable 25 Gram(s) IV Push once  dextrose 50% Injectable 12.5 Gram(s) IV Push once  dextrose 50% Injectable 25 Gram(s) IV Push once  enoxaparin Injectable 40 milliGRAM(s) SubCutaneous daily  glucagon  Injectable 1 milliGRAM(s) IntraMuscular once  insulin glargine Injectable (LANTUS) 28 Unit(s) SubCutaneous at bedtime  insulin lispro (ADMELOG) corrective regimen sliding scale   SubCutaneous three times a day before meals  insulin lispro (ADMELOG) corrective regimen sliding scale   SubCutaneous at bedtime  insulin lispro Injectable (ADMELOG) 24 Unit(s) SubCutaneous before breakfast  insulin lispro Injectable (ADMELOG) 24 Unit(s) SubCutaneous before lunch  insulin lispro Injectable (ADMELOG) 24 Unit(s) SubCutaneous before dinner  sodium chloride 0.65% Nasal 1 Spray(s) Both Nostrils three times a day    MEDICATIONS  (PRN):  acetaminophen   Tablet .. 650 milliGRAM(s) Oral every 4 hours PRN Temp greater or equal to 38C (100.4F), Mild Pain (1 - 3), Moderate Pain (4 - 6)  ALBUTerol    90 MICROgram(s) HFA Inhaler 2 Puff(s) Inhalation every 4 hours PRN Shortness of Breath and/or Wheezing      Allergies  No Known Allergies    Review of Systems:  ALL OTHER SYSTEMS REVIEWED AND NEGATIVE    PHYSICAL EXAM:  VITALS: T(C): 36.8 (04-15-21 @ 11:03)  T(F): 98.3 (04-15-21 @ 11:03), Max: 98.9 (04-14-21 @ 22:45)  HR: 63 (04-15-21 @ 11:03) (51 - 63)  BP: 126/40 (04-15-21 @ 11:03) (103/57 - 126/40)  RR:  (17 - 18)  SpO2:  (92% - 96%)  Wt(kg): --  GENERAL: NAD, well-developed  EYES: No proptosis, anicteric  HEENT:  Atraumatic, Normocephalic  RESPIRATORY: + air movement bilaterally, no respiratory distress   PSYCH: Alert and oriented x 3, reactive affect, euthymic mood     POCT Blood Glucose.: 270 mg/dL (04-15-21 @ 13:20) A 24, A 6  POCT Blood Glucose.: 126 mg/dL (04-15-21 @ 09:14) A 24  POCT Blood Glucose.: 359 mg/dL (04-14-21 @ 21:17) L 28, A 3   POCT Blood Glucose.: 409 mg/dL (04-14-21 @ 21:15)  POCT Blood Glucose.: 395 mg/dL (04-14-21 @ 17:44) A 24, A 10  POCT Blood Glucose.: 338 mg/dL (04-14-21 @ 13:41) A 20, A 8   POCT Blood Glucose.: 318 mg/dL (04-14-21 @ 12:48)  POCT Blood Glucose.: 118 mg/dL (04-14-21 @ 09:52)  POCT Blood Glucose.: 99 mg/dL (04-14-21 @ 08:47)  POCT Blood Glucose.: 271 mg/dL (04-13-21 @ 23:27)  POCT Blood Glucose.: 346 mg/dL (04-13-21 @ 21:20)  POCT Blood Glucose.: 330 mg/dL (04-13-21 @ 17:34)  POCT Blood Glucose.: 395 mg/dL (04-13-21 @ 13:12)  POCT Blood Glucose.: 245 mg/dL (04-13-21 @ 09:31)  POCT Blood Glucose.: 275 mg/dL (04-13-21 @ 01:08)  POCT Blood Glucose.: 419 mg/dL (04-12-21 @ 21:33)  POCT Blood Glucose.: 459 mg/dL (04-12-21 @ 21:32)  POCT Blood Glucose.: 318 mg/dL (04-12-21 @ 18:00)      04-15    138  |  106  |  22  ----------------------------<  105<H>  4.0   |  23  |  0.59    EGFR if : 134  EGFR if non : 116    Ca    8.5      04-15  Mg     2.4     04-15  Phos  2.8     04-15    TPro  6.6  /  Alb  3.0<L>  /  TBili  0.4  /  DBili  x   /  AST  19  /  ALT  26  /  AlkPhos  59  04-15

## 2021-04-15 NOTE — PROGRESS NOTE ADULT - PROBLEM SELECTOR PLAN 1
- on dexamethasone for COVID   - c/w Lantus 28 units qhs  - increase Admelog to 28 units before meals - if BG is 100 or less, can reduce dose by 20%, to prevent significant rebound hyperglycemia if patient is eating  - moderate correction scale qac and qhs  - consistent carb diet  - check FS qac and qhs  - will follow  - notify endocrine service of any changes to steroid plan.

## 2021-04-15 NOTE — PROGRESS NOTE ADULT - PROBLEM SELECTOR PLAN 4
Uncontrolled and volatile  insulin regimen per endo, currently on lantus to 28u hs and admelog 24u tidac, on discharge transition on home regimen of prandin 1 mg before meals  hyperglycemia likely d/t steroids  A1c 7.1  c/w lipitor 10mg hs and ASA 81 mg for cardioprotection  monitor FS closely  pt in steroid study

## 2021-04-16 ENCOUNTER — TRANSCRIPTION ENCOUNTER (OUTPATIENT)
Age: 56
End: 2021-04-16

## 2021-04-16 VITALS
HEART RATE: 74 BPM | TEMPERATURE: 99 F | SYSTOLIC BLOOD PRESSURE: 131 MMHG | DIASTOLIC BLOOD PRESSURE: 66 MMHG | OXYGEN SATURATION: 96 % | RESPIRATION RATE: 16 BRPM

## 2021-04-16 LAB
ANION GAP SERPL CALC-SCNC: 10 MMOL/L — SIGNIFICANT CHANGE UP (ref 7–14)
BUN SERPL-MCNC: 23 MG/DL — SIGNIFICANT CHANGE UP (ref 7–23)
CALCIUM SERPL-MCNC: 8.4 MG/DL — SIGNIFICANT CHANGE UP (ref 8.4–10.5)
CHLORIDE SERPL-SCNC: 104 MMOL/L — SIGNIFICANT CHANGE UP (ref 98–107)
CO2 SERPL-SCNC: 23 MMOL/L — SIGNIFICANT CHANGE UP (ref 22–31)
CREAT SERPL-MCNC: 0.6 MG/DL — SIGNIFICANT CHANGE UP (ref 0.5–1.3)
GLUCOSE SERPL-MCNC: 132 MG/DL — HIGH (ref 70–99)
HCT VFR BLD CALC: 40.6 % — SIGNIFICANT CHANGE UP (ref 39–50)
HGB BLD-MCNC: 13.6 G/DL — SIGNIFICANT CHANGE UP (ref 13–17)
MAGNESIUM SERPL-MCNC: 2.2 MG/DL — SIGNIFICANT CHANGE UP (ref 1.6–2.6)
MCHC RBC-ENTMCNC: 28 PG — SIGNIFICANT CHANGE UP (ref 27–34)
MCHC RBC-ENTMCNC: 33.5 GM/DL — SIGNIFICANT CHANGE UP (ref 32–36)
MCV RBC AUTO: 83.7 FL — SIGNIFICANT CHANGE UP (ref 80–100)
NRBC # BLD: 0 /100 WBCS — SIGNIFICANT CHANGE UP
NRBC # FLD: 0 K/UL — SIGNIFICANT CHANGE UP
PHOSPHATE SERPL-MCNC: 2.5 MG/DL — SIGNIFICANT CHANGE UP (ref 2.5–4.5)
PLATELET # BLD AUTO: 314 K/UL — SIGNIFICANT CHANGE UP (ref 150–400)
POTASSIUM SERPL-MCNC: 3.9 MMOL/L — SIGNIFICANT CHANGE UP (ref 3.5–5.3)
POTASSIUM SERPL-SCNC: 3.9 MMOL/L — SIGNIFICANT CHANGE UP (ref 3.5–5.3)
RBC # BLD: 4.85 M/UL — SIGNIFICANT CHANGE UP (ref 4.2–5.8)
RBC # FLD: 12.6 % — SIGNIFICANT CHANGE UP (ref 10.3–14.5)
SODIUM SERPL-SCNC: 137 MMOL/L — SIGNIFICANT CHANGE UP (ref 135–145)
WBC # BLD: 10.58 K/UL — HIGH (ref 3.8–10.5)
WBC # FLD AUTO: 10.58 K/UL — HIGH (ref 3.8–10.5)

## 2021-04-16 PROCEDURE — 99239 HOSP IP/OBS DSCHRG MGMT >30: CPT

## 2021-04-16 RX ORDER — DEXAMETHASONE 0.5 MG/5ML
1 ELIXIR ORAL
Qty: 1 | Refills: 0
Start: 2021-04-16 | End: 2021-04-16

## 2021-04-16 RX ORDER — RIVAROXABAN 15 MG-20MG
1 KIT ORAL
Qty: 30 | Refills: 0
Start: 2021-04-16 | End: 2021-05-15

## 2021-04-16 RX ORDER — REPAGLINIDE 1 MG/1
0 TABLET ORAL
Qty: 0 | Refills: 0 | DISCHARGE

## 2021-04-16 RX ORDER — ASPIRIN/CALCIUM CARB/MAGNESIUM 324 MG
1 TABLET ORAL
Qty: 0 | Refills: 0 | DISCHARGE
Start: 2021-04-16

## 2021-04-16 RX ORDER — ATORVASTATIN CALCIUM 80 MG/1
1 TABLET, FILM COATED ORAL
Qty: 30 | Refills: 0
Start: 2021-04-16 | End: 2021-05-15

## 2021-04-16 RX ORDER — REPAGLINIDE 1 MG/1
1 TABLET ORAL
Qty: 0 | Refills: 0 | DISCHARGE

## 2021-04-16 RX ADMIN — Medication 1 SPRAY(S): at 06:20

## 2021-04-16 RX ADMIN — Medication 28 UNIT(S): at 18:21

## 2021-04-16 RX ADMIN — Medication 2: at 09:51

## 2021-04-16 RX ADMIN — Medication 28 UNIT(S): at 13:52

## 2021-04-16 RX ADMIN — ENOXAPARIN SODIUM 40 MILLIGRAM(S): 100 INJECTION SUBCUTANEOUS at 11:28

## 2021-04-16 RX ADMIN — Medication 28 UNIT(S): at 09:51

## 2021-04-16 RX ADMIN — Medication 6: at 13:52

## 2021-04-16 RX ADMIN — ATORVASTATIN CALCIUM 10 MILLIGRAM(S): 80 TABLET, FILM COATED ORAL at 21:31

## 2021-04-16 RX ADMIN — Medication 6 MILLIGRAM(S): at 06:19

## 2021-04-16 RX ADMIN — Medication 6: at 18:21

## 2021-04-16 RX ADMIN — Medication 1 SPRAY(S): at 21:30

## 2021-04-16 RX ADMIN — Medication 81 MILLIGRAM(S): at 11:28

## 2021-04-16 RX ADMIN — Medication 1 SPRAY(S): at 13:22

## 2021-04-16 NOTE — PROGRESS NOTE ADULT - SUBJECTIVE AND OBJECTIVE BOX
Dr. Taylor Graves  Pager [phone]    PROGRESS NOTE:     Patient is a 55y old  Male who presents with a chief complaint of RUQ pain (15 Apr 2021 14:42)      SUBJECTIVE / OVERNIGHT EVENTS: pt denies chest pain or sob   ADDITIONAL REVIEW OF SYSTEMS: on 2L o2     MEDICATIONS  (STANDING):  aspirin  chewable 81 milliGRAM(s) Oral daily  atorvastatin 10 milliGRAM(s) Oral at bedtime  dexAMETHasone  Injectable 6 milliGRAM(s) IV Push daily  dextrose 40% Gel 15 Gram(s) Oral once  dextrose 5%. 1000 milliLiter(s) (50 mL/Hr) IV Continuous <Continuous>  dextrose 5%. 1000 milliLiter(s) (100 mL/Hr) IV Continuous <Continuous>  dextrose 50% Injectable 12.5 Gram(s) IV Push once  dextrose 50% Injectable 25 Gram(s) IV Push once  dextrose 50% Injectable 25 Gram(s) IV Push once  enoxaparin Injectable 40 milliGRAM(s) SubCutaneous daily  glucagon  Injectable 1 milliGRAM(s) IntraMuscular once  insulin glargine Injectable (LANTUS) 28 Unit(s) SubCutaneous at bedtime  insulin lispro (ADMELOG) corrective regimen sliding scale   SubCutaneous three times a day before meals  insulin lispro (ADMELOG) corrective regimen sliding scale   SubCutaneous at bedtime  insulin lispro Injectable (ADMELOG) 28 Unit(s) SubCutaneous before breakfast  insulin lispro Injectable (ADMELOG) 28 Unit(s) SubCutaneous before lunch  insulin lispro Injectable (ADMELOG) 28 Unit(s) SubCutaneous before dinner  sodium chloride 0.65% Nasal 1 Spray(s) Both Nostrils three times a day    MEDICATIONS  (PRN):  acetaminophen   Tablet .. 650 milliGRAM(s) Oral every 4 hours PRN Temp greater or equal to 38C (100.4F), Mild Pain (1 - 3), Moderate Pain (4 - 6)  ALBUTerol    90 MICROgram(s) HFA Inhaler 2 Puff(s) Inhalation every 4 hours PRN Shortness of Breath and/or Wheezing      CAPILLARY BLOOD GLUCOSE      POCT Blood Glucose.: 273 mg/dL (16 Apr 2021 13:19)  POCT Blood Glucose.: 174 mg/dL (16 Apr 2021 08:53)  POCT Blood Glucose.: 324 mg/dL (15 Apr 2021 21:15)  POCT Blood Glucose.: 323 mg/dL (15 Apr 2021 17:45)    I&O's Summary      PHYSICAL EXAM:  Vital Signs Last 24 Hrs  T(C): 37 (16 Apr 2021 10:49), Max: 37.1 (16 Apr 2021 05:36)  T(F): 98.6 (16 Apr 2021 10:49), Max: 98.8 (16 Apr 2021 05:36)  HR: 80 (16 Apr 2021 10:49) (54 - 80)  BP: 97/47 (16 Apr 2021 10:49) (97/47 - 116/52)  BP(mean): --  RR: 18 (16 Apr 2021 10:49) (18 - 18)  SpO2: 92% (16 Apr 2021 11:35) (92% - 97%)  CONSTITUTIONAL: NAD, well-developed, NC 2L  RESPIRATORY: Nonlabored, crackles   CARDIOVASCULAR: Regular rate and rhythm, normal S1 and S2, no murmur/rub/gallop; No lower extremity edema; Peripheral pulses are 2+ bilaterally  ABDOMEN: Nontender to palpation, normoactive bowel sounds, no rebound/guarding; No hepatosplenomegaly  MUSCULOSKELETAL: no clubbing or cyanosis of digits; no joint swelling or tenderness to palpation  PSYCH: A+O to person, place, and time; affect appropriate  LABS:                        13.6   10.58 )-----------( 314      ( 16 Apr 2021 07:42 )             40.6     04-16    137  |  104  |  23  ----------------------------<  132<H>  3.9   |  23  |  0.60    Ca    8.4      16 Apr 2021 07:42  Phos  2.5     04-16  Mg     2.2     04-16    TPro  6.6  /  Alb  3.0<L>  /  TBili  0.4  /  DBili  x   /  AST  19  /  ALT  26  /  AlkPhos  59  04-15      RADIOLOGY & ADDITIONAL TESTS:  Results Reviewed:   Imaging Personally Reviewed:    Electrocardiogram Personally Reviewed:    COORDINATION OF CARE:  Care Discussed with Consultants/Other Providers [Y/N]: anastacio tapia, titrate o2  Prior or Outpatient Records Reviewed [Y/N]:

## 2021-04-16 NOTE — PROGRESS NOTE ADULT - PROBLEM SELECTOR PLAN 1
on 2L NC this AM, tapered down to RA and satting 92% with ambulation  completed 5 doses of remdesivir 4/12, c/w dexamethasone 6mg qd x10 doses through 4/18  incentive spirometry, chest PT  BP a bit soft, systolic 90's, asymptomatic  Pt can be discharged home today if okay with endocrine  refer to Bernardo program, email [email or web address] and xarelto 10 mg qd x30 days on DC

## 2021-04-16 NOTE — DISCHARGE NOTE NURSING/CASE MANAGEMENT/SOCIAL WORK - NSDCPEXARELTO_GEN_ALL_CORE
Rivaroxaban/Xarelto - Compliance/Rivaroxaban/Xarelto - Dietary Advice/Rivaroxaban/Xarelto - Follow up monitoring/Rivaroxaban/Xarelto - Potential for adverse drug reactions and interactions

## 2021-04-16 NOTE — PROGRESS NOTE ADULT - PROBLEM SELECTOR PROBLEM 3
COLE (nonalcoholic steatohepatitis)
COLE (nonalcoholic steatohepatitis)
Essential hypertension
Essential hypertension
COLE (nonalcoholic steatohepatitis)
Essential hypertension
COLE (nonalcoholic steatohepatitis)

## 2021-04-16 NOTE — PROGRESS NOTE ADULT - PROBLEM SELECTOR PROBLEM 2
COVID-19
Type 2 diabetes mellitus with hyperglycemia, without long-term current use of insulin
COVID-19
Type 2 diabetes mellitus with hyperglycemia, without long-term current use of insulin
COVID-19
Type 2 diabetes mellitus with hyperglycemia, without long-term current use of insulin
COVID-19

## 2021-04-16 NOTE — PROGRESS NOTE ADULT - PROBLEM SELECTOR PLAN 4
Uncontrolled and volatile  insulin regimen per endo, currently on lantus to 28u hs and admelog 28u tidac, on discharge transition on home regimen of prandin 1 mg before meals  hyperglycemia likely d/t steroids  A1c 7.1  c/w lipitor 10mg hs and ASA 81 mg for cardioprotection  monitor FS closely  pt in steroid study

## 2021-04-16 NOTE — PROGRESS NOTE ADULT - ASSESSMENT
Pt is a 56 yo male with ho DM, HLD a/w acute hypoxic resp failure due to COVID
55 year old man with HTN, HLD, DM2, a1c 7.1%, presents with RUQ pain, found to have COVID, now on oxygen and dexmethasone. Consult called for management of DM2 and steroid induced hyperglycemia, with BG up to the 400's. BG goal 100-180 mg/dL. 
Pt is a 54 yo male with ho DM, HLD a/w acute hypoxic resp failure due to COVID
55 year old man with HTN, HLD, DM2, a1c 7.1%, presents with RUQ pain, found to have COVID, now on oxygen and dexmethasone. Consult called for management of DM2 and steroid induced hyperglycemia, with BG up to the 400's. BG goal 100-180 mg/dL. 
Pt is a 56 yo male with ho DM, HLD a/w acute hypoxic resp failure due to COVID
55 year old man with HTN, HLD, DM2, a1c 7.1%, presents with RUQ pain, found to have COVID, now on oxygen and dexmethasone. Consult called for management of DM2 and steroid induced hyperglycemia, with BG up to the 400's. BG goal 100-180 mg/dL. 
Pt is a 56 yo male with ho DM, HLD a/w acute hypoxic resp failure due to COVID

## 2021-04-16 NOTE — CHART NOTE - NSCHARTNOTEFT_GEN_A_CORE
55 year old man with HTN, HLD, DM2, a1c 7.1%, presents with RUQ pain, found to have COVID, now on oxygen and dexamethasone. Consult called for management of DM2 and steroid induced hyperglycemia, with BG up to the 400's.     Notified by primary team patient planned for discharge today  Patient will be discharged on Dexamethasone 6 mg daily until Sunday 4/18  Patient was previously on oral regimen only (Prandin 1 mg TID) with reasonable glucose control, A1c 7.1%  For discharge since he will only be on steroids for 2 more days, will continue with oral regimen but up-titrate while completing steroid course  Therefore recommend for DC:  Resume Prandin at INCREASED dose of 4 mg PO TID before meals (Hold if not eating meal) for 4/17, 4/18, 4/19  Decrease dose back to home dosing Prandin 1 mg TID before meals starting 4/20  Ensure patient has glucometer, glucose test strips, lancets, alcohol swabs   Note that patient will experience transient hyperglycemia while on remainder of steroid course, however given short term nature and overall stable glucose control previously at home, will proceed with oral regimen for DC. Followup with PCP  While inpatient, please continue with insulin dosing as currently ordered.     CAPILLARY BLOOD GLUCOSE    POCT Blood Glucose.: 273 mg/dL (16 Apr 2021 13:19)  POCT Blood Glucose.: 174 mg/dL (16 Apr 2021 08:53)  POCT Blood Glucose.: 324 mg/dL (15 Apr 2021 21:15)  POCT Blood Glucose.: 323 mg/dL (15 Apr 2021 17:45)    04-16    137  |  104  |  23  ----------------------------<  132<H>  3.9   |  23  |  0.60    Ca    8.4      16 Apr 2021 07:42  Phos  2.5     04-16  Mg     2.2     04-16    TPro  6.6  /  Alb  3.0<L>  /  TBili  0.4  /  DBili  x   /  AST  19  /  ALT  26  /  AlkPhos  59  04-15    MEDICATIONS  (STANDING):  aspirin  chewable 81 milliGRAM(s) Oral daily  atorvastatin 10 milliGRAM(s) Oral at bedtime  dexAMETHasone  Injectable 6 milliGRAM(s) IV Push daily  dextrose 40% Gel 15 Gram(s) Oral once  dextrose 5%. 1000 milliLiter(s) (50 mL/Hr) IV Continuous <Continuous>  dextrose 5%. 1000 milliLiter(s) (100 mL/Hr) IV Continuous <Continuous>  dextrose 50% Injectable 12.5 Gram(s) IV Push once  dextrose 50% Injectable 25 Gram(s) IV Push once  dextrose 50% Injectable 25 Gram(s) IV Push once  enoxaparin Injectable 40 milliGRAM(s) SubCutaneous daily  glucagon  Injectable 1 milliGRAM(s) IntraMuscular once  insulin glargine Injectable (LANTUS) 28 Unit(s) SubCutaneous at bedtime  insulin lispro (ADMELOG) corrective regimen sliding scale   SubCutaneous three times a day before meals  insulin lispro (ADMELOG) corrective regimen sliding scale   SubCutaneous at bedtime  insulin lispro Injectable (ADMELOG) 28 Unit(s) SubCutaneous before breakfast  insulin lispro Injectable (ADMELOG) 28 Unit(s) SubCutaneous before lunch  insulin lispro Injectable (ADMELOG) 28 Unit(s) SubCutaneous before dinner  sodium chloride 0.65% Nasal 1 Spray(s) Both Nostrils three times a day    A1C with Estimated Average Glucose Result: 7.1 % (04-09-21 @ 06:54)  A1C with Estimated Average Glucose Result: 7.2 % (04-08-21 @ 09:16)    Diet, Regular:   Consistent Carbohydrate {Evening Snack} (CSTCHOSN) (04-08-21 @ 06:02)    Rebekah Jones  Nurse Practitioner  Division of Endocrinology & Diabetes  In house pager #[phone]/long range pager #[phone]    If before 9AM or after 6PM, or on weekends/holidays, please call endocrine answering service for assistance ([phone]).  For nonurgent matters email [email or web address] for assistance. 55 year old man with HTN, HLD, DM2, a1c 7.1%, presents with RUQ pain, found to have COVID, now on oxygen and dexamethasone. Consult called for management of DM2 and steroid induced hyperglycemia, with BG up to the 400's.     Notified by primary team patient planned for discharge today  Patient will be discharged on Dexamethasone 6 mg daily until Sunday 4/18  Patient was previously on oral regimen only (Prandin 1 mg TID) with reasonable glucose control, A1c 7.1%  For discharge since he will only be on steroids for 2 more days, will continue with oral regimen but up-titrate while completing steroid course  Therefore recommend for DC:  Resume Prandin at INCREASED dose of 4 mg PO TID before meals (Hold if not eating meal) for 4/17, 4/18, 4/19  Decrease dose back to home dosing Prandin 1 mg TID before meals starting 4/20  Ensure patient has glucometer, glucose test strips, lancets, alcohol swabs   Note that patient will experience transient hyperglycemia while on remainder of steroid course, however given short term nature and overall stable glucose control previously at home, will proceed with oral regimen for DC. Discussed this plan with endocrine attending MD Suarez. Followup with PCP  While inpatient, please continue with insulin dosing as currently ordered.     CAPILLARY BLOOD GLUCOSE    POCT Blood Glucose.: 273 mg/dL (16 Apr 2021 13:19)  POCT Blood Glucose.: 174 mg/dL (16 Apr 2021 08:53)  POCT Blood Glucose.: 324 mg/dL (15 Apr 2021 21:15)  POCT Blood Glucose.: 323 mg/dL (15 Apr 2021 17:45)    04-16    137  |  104  |  23  ----------------------------<  132<H>  3.9   |  23  |  0.60    Ca    8.4      16 Apr 2021 07:42  Phos  2.5     04-16  Mg     2.2     04-16    TPro  6.6  /  Alb  3.0<L>  /  TBili  0.4  /  DBili  x   /  AST  19  /  ALT  26  /  AlkPhos  59  04-15    MEDICATIONS  (STANDING):  aspirin  chewable 81 milliGRAM(s) Oral daily  atorvastatin 10 milliGRAM(s) Oral at bedtime  dexAMETHasone  Injectable 6 milliGRAM(s) IV Push daily  dextrose 40% Gel 15 Gram(s) Oral once  dextrose 5%. 1000 milliLiter(s) (50 mL/Hr) IV Continuous <Continuous>  dextrose 5%. 1000 milliLiter(s) (100 mL/Hr) IV Continuous <Continuous>  dextrose 50% Injectable 12.5 Gram(s) IV Push once  dextrose 50% Injectable 25 Gram(s) IV Push once  dextrose 50% Injectable 25 Gram(s) IV Push once  enoxaparin Injectable 40 milliGRAM(s) SubCutaneous daily  glucagon  Injectable 1 milliGRAM(s) IntraMuscular once  insulin glargine Injectable (LANTUS) 28 Unit(s) SubCutaneous at bedtime  insulin lispro (ADMELOG) corrective regimen sliding scale   SubCutaneous three times a day before meals  insulin lispro (ADMELOG) corrective regimen sliding scale   SubCutaneous at bedtime  insulin lispro Injectable (ADMELOG) 28 Unit(s) SubCutaneous before breakfast  insulin lispro Injectable (ADMELOG) 28 Unit(s) SubCutaneous before lunch  insulin lispro Injectable (ADMELOG) 28 Unit(s) SubCutaneous before dinner  sodium chloride 0.65% Nasal 1 Spray(s) Both Nostrils three times a day    A1C with Estimated Average Glucose Result: 7.1 % (04-09-21 @ 06:54)  A1C with Estimated Average Glucose Result: 7.2 % (04-08-21 @ 09:16)    Diet, Regular:   Consistent Carbohydrate {Evening Snack} (CSTCHOSN) (04-08-21 @ 06:02)    Rebekah Jones  Nurse Practitioner  Division of Endocrinology & Diabetes  In house pager #[phone]/long range pager #[phone]    If before 9AM or after 6PM, or on weekends/holidays, please call endocrine answering service for assistance ([phone]).  For nonurgent matters email [email or web address] for assistance.

## 2021-04-16 NOTE — PROGRESS NOTE ADULT - PROBLEM SELECTOR PROBLEM 1
Acute respiratory failure with hypoxia
Acute respiratory failure with hypoxia
Steroid-induced hyperglycemia
Acute respiratory failure with hypoxia
Steroid-induced hyperglycemia
Steroid-induced hyperglycemia
Acute respiratory failure with hypoxia

## 2021-04-16 NOTE — DISCHARGE NOTE NURSING/CASE MANAGEMENT/SOCIAL WORK - NSDCFUADDAPPT_GEN_ALL_CORE_FT
Follow up with your primary care doctor within one week of discharge. If you do not have one, you can call the medicine clinic at [phone] to make an appointment.

## 2021-04-16 NOTE — DISCHARGE NOTE NURSING/CASE MANAGEMENT/SOCIAL WORK - PATIENT PORTAL LINK FT
You can access the FollowMyHealth Patient Portal offered by [facility] by registering at the following website: [email or web address]. By joining [employer]’s FollowMyHealth portal, you will also be able to view your health information using other applications (apps) compatible with our system.

## 2021-04-16 NOTE — CHART NOTE - NSCHARTNOTEFT_GEN_A_CORE
54 y/o M PMH HLD, DM2 p/w RUQ pain X2 day and non-productive cough while in the ED, admitted w/ acute respiratory failure with hypoxia 2/2 COVID-19, completed remdesivir 4/12, will complete dexamethasone 6mg qd tomorrow on 4/18.    Given hx of DM (AIC 7.1) and hyperglycemia while on steroids, endocrine closely following patient. Discharge recs reviewed, clarified with Endocrine NP Rebekah given that patient will only require additional oral steroid as outpatient x1 day after discharge.  Per conversation with Endocrine NP Rebekah, on discharge Resume Prandin at INCREASED dose of 4 mg PO TID before meals (Hold if not eating meal) for day of steroid and one additonal day.  Given this recommendation, patient will be discharge with the following plan:   1) Dexamethasone 6 mg once daily x1 dose (4/17)  2) Prandin at INCREASED dose of 4 mg PO TID before meals (Hold if not eating meal) on 4/17, 4/18.   3) Decrease dose back to home dosing Prandin 1 mg TID before meals starting 4/19.    The above was discussed with patient and his wife who verbalize understanding.      Zuleyka Henriquez, NP-BC  Department of Medicine  In House Pager #[phone]

## 2021-04-16 NOTE — PROGRESS NOTE ADULT - PROBLEM SELECTOR PROBLEM 4
Diabetes mellitus type 2, noninsulin dependent
Hyperlipidemia, unspecified hyperlipidemia type
Hyperlipidemia, unspecified hyperlipidemia type
Diabetes mellitus type 2, noninsulin dependent
Hyperlipidemia, unspecified hyperlipidemia type
Diabetes mellitus type 2, noninsulin dependent

## 2021-04-16 NOTE — PROGRESS NOTE ADULT - PROBLEM SELECTOR PLAN 2
- see plan above  - for discharge: plan to discharge on home regimen of Prandin 1 mg before meals.
taper o2 as tolerated, on 5L NC this morning  trend inflammatory markers (crp, ferritin, d-dimer) q72h  as above
taper o2 as tolerated, on 6L NC this morning  trend inflammatory markers (crp, ferritin, d-dimer) q72h  as above
trend inflam markers
trend inflam markers
- see plan above  - for discharge: plan to discharge on home regimen of Prandin 1 mg before meals.
taper o2 as tolerated, on 6L NC this morning  trend inflammatory markers (crp, ferritin, d-dimer) q72h  as above
weaned to RA as above  trend inflammatory markers (crp, ferritin, d-dimer) q72h  d/c plan home
- see plan above  - for discharge: plan to discharge on home regimen of Prandin 1 mg before meals.
trend inflam markers
taper o2 as tolerated, on 6L NC this morning  trend inflammatory markers (crp, ferritin, d-dimer) q72h  as above

## 2021-04-16 NOTE — PROGRESS NOTE ADULT - PROVIDER SPECIALTY LIST ADULT
Endocrinology
Hospitalist
Endocrinology
Hospitalist
Hospitalist
Endocrinology
Hospitalist
Hospitalist

## 2021-04-17 ENCOUNTER — TRANSCRIPTION ENCOUNTER (OUTPATIENT)
Age: 56
End: 2021-04-17

## 2021-04-17 ENCOUNTER — APPOINTMENT (OUTPATIENT)
Dept: PULMONOLOGY | Facility: CLINIC | Age: 56
End: 2021-04-17

## 2021-04-17 ENCOUNTER — APPOINTMENT (OUTPATIENT)
Dept: PULMONOLOGY | Facility: CLINIC | Age: 56
End: 2021-04-17
Payer: MEDICAID

## 2021-04-17 DIAGNOSIS — U07.1 COVID-19: ICD-10-CM

## 2021-04-17 PROBLEM — E78.5 HYPERLIPIDEMIA, UNSPECIFIED: Chronic | Status: ACTIVE | Noted: 2021-04-08

## 2021-04-17 PROBLEM — E11.9 TYPE 2 DIABETES MELLITUS WITHOUT COMPLICATIONS: Chronic | Status: ACTIVE | Noted: 2021-04-08

## 2021-04-17 PROBLEM — Z00.00 ENCOUNTER FOR PREVENTIVE HEALTH EXAMINATION: Status: ACTIVE | Noted: 2021-04-17

## 2021-04-17 PROBLEM — Z78.9 OTHER SPECIFIED HEALTH STATUS: Chronic | Status: ACTIVE | Noted: 2021-04-08

## 2021-04-17 PROCEDURE — 99443: CPT

## 2021-04-17 RX ORDER — ASPIRIN ENTERIC COATED TABLETS 81 MG 81 MG/1
81 TABLET, DELAYED RELEASE ORAL
Refills: 0 | Status: ACTIVE | COMMUNITY
Start: 2021-04-17

## 2021-04-17 NOTE — HISTORY OF PRESENT ILLNESS
[Home] : at home, [unfilled] , at the time of the visit. [Medical Office: ([facility])___] : at the medical office located in  [Verbal consent obtained from patient] : the patient, [unfilled] [FreeTextEntry1] : 56 yo M being seen for initial [facility] visit, post hospitalization - patient unable to access Televisit - used audio only\par Hospital Course: [facility] 4/8-4/16/21\par H/o HLD, DM2 who p/w RUQ pain x2 days, non-productive cough, COVID positive 4/8. \par Prior had been seen at Urgent Care 4/7, COVID neg, discharged with antibiotics. \par \par Admitted with Acute respiratory failure with hypoxia 2/2 COVID-19 - required supplemental oxygen, weaned to RA and 92% with ambulation \par Completed 5 doses of Remdesivir, c/w dexamethasone 6mg qd x10 doses through 4/18 \par Discharged with Xarelto 10 mg qd x30 days \par \par Pertinent labs: Ddimer 4/14 <150 (consistently low), hyperglycemia, otherwise WNL\par Imaging: CXR 4/8: left basilar opacity\par \par Currently doing well overall. Discharged home last night - slept last night. Appetite improved. Mild RM and fatigue after showering. Denies cough. \par Pulse oximetry - checking, maintaining high 90s.

## 2021-04-17 NOTE — PLAN
[FreeTextEntry1] : COVID 19 respiratory infection, s/p hospitalization with acute respiratory failure\par \par Plan:\par Pulse oximetry monitoring, instructed to call if SpO2 <94% and worsening SOB\par Medications- would d/c Xarelto as low risk, Ddimer consistent low. Encouraged to ambulate. Will c/w baby ASA\par Repeat CXR and PFTs and in office visit at 6 weeks\par \par Above was discussed with the patient at length, all questions answered and he appears to understand. \par Provided with office contact info and encouraged to call with any issues.

## 2021-05-22 ENCOUNTER — APPOINTMENT (OUTPATIENT)
Dept: DISASTER EMERGENCY | Facility: CLINIC | Age: 56
End: 2021-05-22

## 2021-05-22 DIAGNOSIS — Z01.818 ENCOUNTER FOR OTHER PREPROCEDURAL EXAMINATION: ICD-10-CM

## 2021-05-23 LAB — SARS-COV-2 N GENE NPH QL NAA+PROBE: NOT DETECTED

## 2021-05-25 ENCOUNTER — APPOINTMENT (OUTPATIENT)
Dept: PULMONOLOGY | Facility: CLINIC | Age: 56
End: 2021-05-25
Payer: MEDICAID

## 2021-05-25 ENCOUNTER — APPOINTMENT (OUTPATIENT)
Dept: RADIOLOGY | Facility: IMAGING CENTER | Age: 56
End: 2021-05-25
Payer: MEDICAID

## 2021-05-25 ENCOUNTER — OUTPATIENT (OUTPATIENT)
Dept: OUTPATIENT SERVICES | Facility: HOSPITAL | Age: 56
LOS: 1 days | End: 2021-05-25
Payer: MEDICAID

## 2021-05-25 VITALS
HEART RATE: 68 BPM | BODY MASS INDEX: 26.91 KG/M2 | TEMPERATURE: 96.7 F | OXYGEN SATURATION: 98 % | WEIGHT: 150 LBS | DIASTOLIC BLOOD PRESSURE: 81 MMHG | SYSTOLIC BLOOD PRESSURE: 127 MMHG | HEIGHT: 62.5 IN

## 2021-05-25 DIAGNOSIS — Z78.9 OTHER SPECIFIED HEALTH STATUS: ICD-10-CM

## 2021-05-25 DIAGNOSIS — B94.8 SEQUELAE OF OTHER SPECIFIED INFECTIOUS AND PARASITIC DISEASES: ICD-10-CM

## 2021-05-25 DIAGNOSIS — U07.1 COVID-19: ICD-10-CM

## 2021-05-25 DIAGNOSIS — J98.4 OTHER DISORDERS OF LUNG: ICD-10-CM

## 2021-05-25 PROCEDURE — 71046 X-RAY EXAM CHEST 2 VIEWS: CPT | Mod: 26

## 2021-05-25 PROCEDURE — 71046 X-RAY EXAM CHEST 2 VIEWS: CPT

## 2021-05-25 PROCEDURE — ZZZZZ: CPT

## 2021-05-25 PROCEDURE — 99214 OFFICE O/P EST MOD 30 MIN: CPT | Mod: 25

## 2021-05-25 PROCEDURE — 94726 PLETHYSMOGRAPHY LUNG VOLUMES: CPT

## 2021-05-25 PROCEDURE — 94010 BREATHING CAPACITY TEST: CPT

## 2021-05-25 PROCEDURE — 94729 DIFFUSING CAPACITY: CPT

## 2021-05-25 RX ORDER — DEXAMETHASONE 6 MG/1
6 TABLET ORAL
Refills: 0 | Status: DISCONTINUED | COMMUNITY
Start: 2021-04-17 | End: 2021-05-25

## 2021-05-25 RX ORDER — OMEPRAZOLE 20 MG/1
20 CAPSULE, DELAYED RELEASE ORAL
Refills: 0 | Status: ACTIVE | COMMUNITY

## 2021-05-25 RX ORDER — ATORVASTATIN CALCIUM 20 MG/1
20 TABLET, FILM COATED ORAL
Refills: 0 | Status: ACTIVE | COMMUNITY

## 2021-05-25 RX ORDER — REPAGLINIDE 1 MG/1
1 TABLET ORAL
Refills: 0 | Status: ACTIVE | COMMUNITY

## 2021-05-25 NOTE — HISTORY OF PRESENT ILLNESS
[FreeTextEntry1] : 54 yo M being seen for initial CROWN visit, post hospitalization - patient unable to access Televisit - used audio only\par Hospital Course: [facility] 4/8-4/16/21\par H/o HLD, DM2 who p/w RUQ pain x2 days, non-productive cough, COVID positive 4/8. \par Prior had been seen at Urgent Care 4/7, COVID neg, discharged with antibiotics. \par \par Admitted with Acute respiratory failure with hypoxia 2/2 COVID-19 - required supplemental oxygen, weaned to RA and 92% with ambulation \par Completed 5 doses of Remdesivir, c/w dexamethasone 6mg qd x10 doses through 4/18 \par Discharged with Xarelto 10 mg qd x30 days \par \par Pertinent labs: Ddimer 4/14 <150 (consistently low), hyperglycemia, otherwise WNL\par Imaging: CXR 4/8: left basilar opacity\par \par Currently doing well overall. Discharged home last night - slept last night. Appetite improved. Mild RM and fatigue after showering. Denies cough. \par Pulse oximetry - checking, maintaining high 90s.\par \par Follow up 5/25/21:\par Feels much better overall - wakes with mild chest tightness in the morning, states it improves once he is up and moving around. 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  \par

## 2021-05-25 NOTE — CONSULT LETTER
[Dear  ___] : Dear  [unfilled], [Courtesy Letter:] : I had the pleasure of seeing your patient, [unfilled], in my office today. [Please see my note below.] : Please see my note below. [Consult Closing:] : Thank you very much for allowing me to participate in the care of this patient.  If you have any questions, please do not hesitate to contact me. [Sincerely,] : Sincerely, [FreeTextEntry2] : Dr. Madison Navarrete [FreeTextEntry3] : Merry Michelle MD\par  \par Division of Pulmonary, Critical Care & Sleep Medicine\par [facility] [employer] [facility]\par \par \par

## 2021-05-25 NOTE — REVIEW OF SYSTEMS
[Cough] : no cough [Hemoptysis] : no hemoptysis [Chest Tightness] : chest tightness [Frequent URIs] : no frequent URIs [Sputum] : no sputum [Dyspnea] : no dyspnea [Pleuritic Pain] : no pleuritic pain [Wheezing] : no wheezing [A.M. Dry Mouth] : no a.m. dry mouth [SOB on Exertion] : no sob on exertion [Diabetes] : diabetes [Negative] : Psychiatric

## 2021-05-25 NOTE — DISCUSSION/SUMMARY
[FreeTextEntry1] : Post COVID infection and pneumonia\par Mild restrictive dysfunction and moderately reduced DLCO\par Clinically much improved, not hypoxemia\par \par Plan:\par Repeat CXR PA/Lat \par Follow up in 3 months and for PFTs \par WIll call with issues/questions prior.

## 2024-04-01 NOTE — DISCHARGE NOTE PROVIDER - PROVIDER RX CONTACT NUMBER
[phone] Bed/Stretcher in lowest position, wheels locked, appropriate side rails in place/Call bell, personal items and telephone in reach/Instruct patient to call for assistance before getting out of bed/chair/stretcher/Non-slip footwear applied when patient is off stretcher/[place] to call system/Physically safe environment - no spills, clutter or unnecessary equipment/Purposeful proactive rounding/Room/bathroom lighting operational, light cord in reach